# Patient Record
Sex: FEMALE | Race: WHITE | NOT HISPANIC OR LATINO | Employment: FULL TIME | URBAN - METROPOLITAN AREA
[De-identification: names, ages, dates, MRNs, and addresses within clinical notes are randomized per-mention and may not be internally consistent; named-entity substitution may affect disease eponyms.]

---

## 2017-01-11 ENCOUNTER — OFFICE VISIT (OUTPATIENT)
Dept: INTERNAL MEDICINE | Age: 52
End: 2017-01-11

## 2017-01-11 VITALS
HEART RATE: 80 BPM | OXYGEN SATURATION: 99 % | DIASTOLIC BLOOD PRESSURE: 80 MMHG | HEIGHT: 64 IN | SYSTOLIC BLOOD PRESSURE: 142 MMHG | WEIGHT: 151 LBS | TEMPERATURE: 97.8 F | BODY MASS INDEX: 25.78 KG/M2

## 2017-01-11 DIAGNOSIS — R03.0 ELEVATED BLOOD PRESSURE (NOT HYPERTENSION): Primary | Chronic | ICD-10-CM

## 2017-01-11 PROCEDURE — 99212 OFFICE O/P EST SF 10 MIN: CPT | Performed by: INTERNAL MEDICINE

## 2017-01-11 NOTE — ASSESSMENT & PLAN NOTE
Ambulatory blood pressures are stable.  Continue low-sodium diet, regular exercise and maintain ideal body weight.  Consider medication if blood pressure is greater than 140 systolically.

## 2017-01-11 NOTE — MR AVS SNAPSHOT
Autumn Geiger   1/11/2017 11:20 AM   Office Visit    Dept Phone:  [phone]   Encounter #:  [id number]    Provider:  Sanchez Perez MD   Department:  [facility]                Your Full Care Plan              Today's Medication Changes          These changes are accurate as of: 1/11/17 12:03 PM.  If you have any questions, ask your nurse or doctor.               Stop taking medication(s)listed here:     MUCINEX ALLERGY PO   Stopped by:  Sanchez Perez MD                      Your Updated Medication List          This list is accurate as of: 1/11/17 12:03 PM.  Always use your most recent med list.                CALCIUM PO       ibuprofen 200 MG tablet   Commonly known as:  ADVIL,MOTRIN       RECLIPSEN 0.15-30 MG-MCG per tablet   Generic drug:  desogestrel-ethinyl estradiol               You Were Diagnosed With        Codes Comments    Elevated blood pressure (not hypertension)    -  Primary ICD-10-CM: R03.0  ICD-9-CM: 796.2       Instructions     None    Patient Instructions History      Upcoming Appointments     Visit Type Date Time Department    OFFICE VISIT 1/11/2017 11:20 AM NILSA [facility]TS [id number]      [employer] Signup     Our records indicate that you have an active [employer] account.    You can view your After Visit Summary by going to [employer] and logging in with your [employer] username and password.  If you don't have a [employer] username and password but a parent or guardian has access to your record, the parent or guardian should login with their own [employer] username and password and access your record to view the After Visit Summary.    If you have questions, you can email [email or web address] or call [phone] to talk to our [employer] staff.  Remember, [employer] is NOT to be used for urgent needs.  For medical emergencies, dial 911.               Other Info from Your Visit           Allergies     Erythromycin      GI upset.  "     Reason for Visit     elevated blood pressure f/u           Vital Signs     Blood Pressure Pulse Temperature Height Weight Oxygen Saturation    142/80 80 97.8 °F (36.6 °C) 64\" (162.6 cm) 151 lb (68.5 kg) 99%    Body Mass Index Smoking Status                25.92 kg/m2 Never Smoker          Problems and Diagnoses Noted     Elevated blood pressure (not hypertension)        "

## 2017-01-11 NOTE — PROGRESS NOTES
"Autumn Geiger / 51 y.o. / female  01/11/2017    VITALS    Visit Vitals   • /80   • Pulse 80   • Temp 97.8 °F (36.6 °C)   • Ht 64\" (162.6 cm)   • Wt 151 lb (68.5 kg)   • SpO2 99%   • BMI 25.92 kg/m2     BP Readings from Last 3 Encounters:   01/11/17 142/80   11/11/16 162/92   09/18/15 134/82     Wt Readings from Last 3 Encounters:   01/11/17 151 lb (68.5 kg)   11/11/16 156 lb (70.8 kg)   09/18/15 158 lb 15.9 oz (72.1 kg)      Body mass index is 25.92 kg/(m^2).    CC:  Main reason(s) for today's visit: elevated blood pressure f/u      HPI:     History of elevated blood pressure without hypertension and family history of hypertension.  She has been checking her blood pressure regularly at home and her systolic blood pressure generally is in the low 120s and diastolic blood pressure ranges from upper 70s to lower 80s.  Denies headaches.  Her blood pressure tends to run higher at physician's offices.    ____________________________________________________________________    ASSESSMENT & PLAN:    Problem List Items Addressed This Visit        High    Elevated blood pressure (not hypertension) - Primary (Chronic)    Current Assessment & Plan     Ambulatory blood pressures are stable.  Continue low-sodium diet, regular exercise and maintain ideal body weight.  Consider medication if blood pressure is greater than 140 systolically.             No orders of the defined types were placed in this encounter.      Summary/Discussion:     ·       Return in about 6 months (around 7/11/2017) for Annual physical.    No future appointments.    ____________________________________________________________________    REVIEW OF SYSTEMS    Review of Systems  As noted per HPI  Constitutional neg   Resp neg   CV neg    Other: As noted per HPI      PHYSICAL EXAMINATION    Physical Exam  Constitutional  No distress   Cardiovascular Rate  normal . Rhythm: regular . Heart sounds:  normal    Psychiatric  Alert. Judgment and thought " content normal. Mood normal      REVIEWED DATA:    Labs:   Lab Results   Component Value Date     09/18/2015    K 4.3 09/18/2015    AST 18 09/18/2015    ALT 14 09/18/2015    BUN 15 09/18/2015    CREATININE 0.84 09/18/2015    EGFRIFNONA >60 09/18/2015    EGFRIFAFRI >60 09/18/2015       Lab Results   Component Value Date    GLU 85 09/18/2015    HGBA1C 5.4 09/18/2015       Lab Results   Component Value Date     (H) 09/18/2015    HDL 77 (H) 09/18/2015    TRIG 118 09/18/2015    CHOLHDLRATIO 2.9 09/18/2015       Lab Results   Component Value Date    TSH 1.60 09/18/2015    FREET4 1.22 09/18/2015          Lab Results   Component Value Date    WBC 8.10 09/18/2015    WBC 0-2 09/18/2015    HGB 13.0 09/18/2015     09/18/2015        Imaging:        Medical Tests:        Summary of old records / correspondence / consultant report:        Request outside records:          ALLERGIES:    Erythromycin    MEDICATIONS:  Current Outpatient Prescriptions   Medication Sig Dispense Refill   • RECLIPSEN 0.15-30 MG-MCG per tablet TK 1 T PO D  0   • CALCIUM PO Take  by mouth Daily.     • ibuprofen (ADVIL,MOTRIN) 200 MG tablet Take 200 mg by mouth Every 6 (Six) Hours As Needed for mild pain (1-3) (prn).       No current facility-administered medications for this visit.      Outpatient Medications Prior to Visit   Medication Sig Dispense Refill   • RECLIPSEN 0.15-30 MG-MCG per tablet TK 1 T PO D  0   • CALCIUM PO Take  by mouth Daily.     • ibuprofen (ADVIL,MOTRIN) 200 MG tablet Take 200 mg by mouth Every 6 (Six) Hours As Needed for mild pain (1-3) (prn).     • Fexofenadine HCl (MUCINEX ALLERGY PO) Take  by mouth As Needed.       No facility-administered medications prior to visit.        PFSH    The following portions of the patient's history were reviewed and updated as appropriate: Allergies / Current Medications / Past Medical History / Surgical History / Social History / Family History    PROBLEM LIST:    Patient Active  Problem List   Diagnosis   • Elevated blood pressure (not hypertension)   • Atopic rhinitis       PAST MEDICAL HX:    Past Medical History   Diagnosis Date   • Hypothyroidism      In remission for 15 years   • Pneumonia      4 years ago       PAST SURGICAL HX:    Past Surgical History   Procedure Laterality Date   • Tonsillectomy     • Colonoscopy         SOCIAL HX:    Social History     Social History   • Marital status:      Spouse name: N/A   • Number of children: N/A   • Years of education: N/A     Social History Main Topics   • Smoking status: Never Smoker   • Smokeless tobacco: Never Used   • Alcohol use No   • Drug use: No   • Sexual activity: Not Asked     Other Topics Concern   • None     Social History Narrative       FAMILY HX:    Family History   Problem Relation Age of Onset   • Heart disease Mother    • Hyperlipidemia Mother    • Hypertension Mother    • Arthritis Mother    • Diabetes Mother    • Hyperlipidemia Father    • Hypertension Father

## 2017-08-21 DIAGNOSIS — Z00.00 PREVENTATIVE HEALTH CARE: Primary | ICD-10-CM

## 2017-08-23 LAB
ALBUMIN SERPL-MCNC: 4.2 G/DL (ref 3.5–5.2)
ALBUMIN/GLOB SERPL: 1.6 G/DL
ALP SERPL-CCNC: 68 U/L (ref 39–117)
ALT SERPL-CCNC: 12 U/L (ref 1–33)
APPEARANCE UR: CLEAR
AST SERPL-CCNC: 19 U/L (ref 1–32)
BACTERIA #/AREA URNS HPF: ABNORMAL /HPF
BASOPHILS # BLD AUTO: 0.02 10*3/MM3 (ref 0–0.2)
BASOPHILS NFR BLD AUTO: 0.3 % (ref 0–1.5)
BILIRUB SERPL-MCNC: 0.9 MG/DL (ref 0.1–1.2)
BILIRUB UR QL STRIP: NEGATIVE
BUN SERPL-MCNC: 12 MG/DL (ref 6–20)
BUN/CREAT SERPL: 13.6 (ref 7–25)
CALCIUM SERPL-MCNC: 9.3 MG/DL (ref 8.6–10.5)
CASTS URNS MICRO: ABNORMAL
CHLORIDE SERPL-SCNC: 100 MMOL/L (ref 98–107)
CHOLEST SERPL-MCNC: 205 MG/DL (ref 0–200)
CHOLEST/HDLC SERPL: 2.97 {RATIO}
CO2 SERPL-SCNC: 23.8 MMOL/L (ref 22–29)
COLOR UR: YELLOW
CREAT SERPL-MCNC: 0.88 MG/DL (ref 0.57–1)
EOSINOPHIL # BLD AUTO: 0.07 10*3/MM3 (ref 0–0.7)
EOSINOPHIL NFR BLD AUTO: 1 % (ref 0.3–6.2)
EPI CELLS #/AREA URNS HPF: ABNORMAL /HPF
ERYTHROCYTE [DISTWIDTH] IN BLOOD BY AUTOMATED COUNT: 13.2 % (ref 11.7–13)
GLOBULIN SER CALC-MCNC: 2.7 GM/DL
GLUCOSE SERPL-MCNC: 87 MG/DL (ref 65–99)
GLUCOSE UR QL: NEGATIVE
HBA1C MFR BLD: 5.4 % (ref 4.8–5.6)
HCT VFR BLD AUTO: 38.6 % (ref 35.6–45.5)
HCV AB S/CO SERPL IA: <0.1 S/CO RATIO (ref 0–0.9)
HDLC SERPL-MCNC: 69 MG/DL (ref 40–60)
HGB BLD-MCNC: 12.3 G/DL (ref 11.9–15.5)
HGB UR QL STRIP: ABNORMAL
IMM GRANULOCYTES # BLD: 0 10*3/MM3 (ref 0–0.03)
IMM GRANULOCYTES NFR BLD: 0 % (ref 0–0.5)
KETONES UR QL STRIP: NEGATIVE
LDLC SERPL CALC-MCNC: 110 MG/DL (ref 0–100)
LEUKOCYTE ESTERASE UR QL STRIP: ABNORMAL
LYMPHOCYTES # BLD AUTO: 2.13 10*3/MM3 (ref 0.9–4.8)
LYMPHOCYTES NFR BLD AUTO: 30.4 % (ref 19.6–45.3)
MCH RBC QN AUTO: 30 PG (ref 26.9–32)
MCHC RBC AUTO-ENTMCNC: 31.9 G/DL (ref 32.4–36.3)
MCV RBC AUTO: 94.1 FL (ref 80.5–98.2)
MONOCYTES # BLD AUTO: 0.46 10*3/MM3 (ref 0.2–1.2)
MONOCYTES NFR BLD AUTO: 6.6 % (ref 5–12)
NEUTROPHILS # BLD AUTO: 4.32 10*3/MM3 (ref 1.9–8.1)
NEUTROPHILS NFR BLD AUTO: 61.7 % (ref 42.7–76)
NITRITE UR QL STRIP: NEGATIVE
PH UR STRIP: 7 [PH] (ref 5–8)
PLATELET # BLD AUTO: 262 10*3/MM3 (ref 140–500)
POTASSIUM SERPL-SCNC: 4.4 MMOL/L (ref 3.5–5.2)
PROT SERPL-MCNC: 6.9 G/DL (ref 6–8.5)
PROT UR QL STRIP: NEGATIVE
RBC # BLD AUTO: 4.1 10*6/MM3 (ref 3.9–5.2)
RBC #/AREA URNS HPF: ABNORMAL /HPF
SODIUM SERPL-SCNC: 137 MMOL/L (ref 136–145)
SP GR UR: 1.01 (ref 1–1.03)
T4 FREE SERPL-MCNC: 1.19 NG/DL (ref 0.93–1.7)
TRIGL SERPL-MCNC: 132 MG/DL (ref 0–150)
TSH SERPL DL<=0.005 MIU/L-ACNC: 2.05 MIU/ML (ref 0.27–4.2)
UROBILINOGEN UR STRIP-MCNC: ABNORMAL MG/DL
VLDLC SERPL CALC-MCNC: 26.4 MG/DL (ref 5–40)
WBC # BLD AUTO: 7 10*3/MM3 (ref 4.5–10.7)
WBC #/AREA URNS HPF: ABNORMAL /HPF

## 2017-08-29 ENCOUNTER — OFFICE VISIT (OUTPATIENT)
Dept: INTERNAL MEDICINE | Age: 52
End: 2017-08-29

## 2017-08-29 VITALS
HEART RATE: 84 BPM | WEIGHT: 156 LBS | BODY MASS INDEX: 26.63 KG/M2 | DIASTOLIC BLOOD PRESSURE: 82 MMHG | SYSTOLIC BLOOD PRESSURE: 134 MMHG | OXYGEN SATURATION: 98 % | TEMPERATURE: 97.6 F | HEIGHT: 64 IN

## 2017-08-29 DIAGNOSIS — Z23 NEED FOR VACCINATION FOR STREP PNEUMONIAE: ICD-10-CM

## 2017-08-29 DIAGNOSIS — Z00.00 ENCOUNTER FOR ANNUAL HEALTH EXAMINATION: Primary | ICD-10-CM

## 2017-08-29 PROBLEM — J18.9 PNEUMONIA: Status: ACTIVE | Noted: 2017-08-29

## 2017-08-29 PROBLEM — J18.9 PNEUMONIA: Status: RESOLVED | Noted: 2017-08-29 | Resolved: 2017-08-29

## 2017-08-29 PROBLEM — E05.90 HYPERTHYROIDISM: Status: ACTIVE | Noted: 2017-08-29

## 2017-08-29 PROCEDURE — 90670 PCV13 VACCINE IM: CPT | Performed by: INTERNAL MEDICINE

## 2017-08-29 PROCEDURE — 90471 IMMUNIZATION ADMIN: CPT | Performed by: INTERNAL MEDICINE

## 2017-08-29 PROCEDURE — 99396 PREV VISIT EST AGE 40-64: CPT | Performed by: INTERNAL MEDICINE

## 2017-08-29 NOTE — PROGRESS NOTES
Autumn Geiger / 52 y.o. / female  08/29/2017    CC: Main reason(s) for today's visit: Annual Exam      HPI:      Autumn presents for annual health maintenance visit.    · Last health maintenance visit: 2 years ago  · General health: good  · Lifestyle:  · Attempting to lose weight?: Yes   · Diet: adequate/fair  · Exercise: could be better  · Tobacco: Never used   · Alcohol: does not drink  · Work: Full-time  · Reproductive health:  · Sexually active?: Yes   · Sexual problems?: No problems  · Concern for STD?: No    · Sees Gynecologist?: Yes   · Sylwia/Postmenopausal?: Yes (?)  · Domestic abuse concerns: No   · Depression Screening:      PHQ-2/PHQ-9 Depression Screening 8/29/2017   Little interest or pleasure in doing things 0   Feeling down, depressed, or hopeless 0   Total Score 0         PHQ-2: 0 (Not depressed)   PHQ-9:     Patient Care Team:  Sanchez Perez MD as PCP - Family Medicine  Joey Sorenson MD as Consulting Physician (Obstetrics and Gynecology)  ______________________________________________________________________    Allergies   Allergen Reactions   • Erythromycin GI Intolerance     GI upset.       Current Outpatient Prescriptions on File Prior to Visit   Medication Sig Dispense Refill   • CALCIUM PO Take  by mouth Daily.     • ibuprofen (ADVIL,MOTRIN) 200 MG tablet Take 200 mg by mouth Every 6 (Six) Hours As Needed for mild pain (1-3) (prn).     • RECLIPSEN 0.15-30 MG-MCG per tablet TK 1 T PO D  0     No current facility-administered medications on file prior to visit.        PFSH:     The following portions of the patient's history were reviewed and updated as appropriate: Allergies / Current Medications / Past Medical History / Surgical History / Social History / Family History    Patient Active Problem List   Diagnosis   • Elevated blood pressure (not hypertension)   • AR (allergic rhinitis)       Past Medical History:   Diagnosis Date   • Hyperthyroidism 8/29/2017    During pregnancy, resolved   •  "Pneumonia     several times       Past Surgical History:   Procedure Laterality Date   • BREAST CYST ASPIRATION      x 2   • COLONOSCOPY     • TONSILLECTOMY         Social History     Social History   • Marital status:      Spouse name: Surekha   • Number of children: 2   • Years of education: N/A     Occupational History   •       Social History Main Topics   • Smoking status: Never Smoker   • Smokeless tobacco: Never Used   • Alcohol use No   • Drug use: No   • Sexual activity: Yes     Partners: Female, Male     Birth control/ protection: OCP     Other Topics Concern   • None     Social History Narrative    1 son with Down's       Family History   Problem Relation Age of Onset   • Heart disease Mother    • Hyperlipidemia Mother    • Hypertension Mother    • Arthritis Mother    • Diabetes Mother    • Cancer Mother      small bowel cancer   • Cirrhosis Mother    • Hyperlipidemia Father    • Hypertension Father    • Down syndrome Son    • Clotting disorder Brother      PE   • Hypothyroidism Brother    • Melanoma Brother    • Obesity Brother        Immunization History   Administered Date(s) Administered   • Influenza TIV (IM) 09/01/2011, 10/06/2016   • Tdap 01/25/2012   • Varicella 08/29/1998       ______________________________________________________________________    REVIEW OF SYSTEMS    Review of Systems   Constitutional: Negative.    HENT: Positive for tinnitus. Negative for hearing loss.    Eyes: Negative.    Respiratory: Negative.    Cardiovascular: Negative.    Gastrointestinal: Negative.    Endocrine: Negative.    Genitourinary: Negative.    Musculoskeletal: Negative.    Skin: Negative.    Allergic/Immunologic: Positive for environmental allergies.   Neurological: Negative.    Hematological: Negative.    Psychiatric/Behavioral: Negative.        VITALS:    /82  Pulse 84  Temp 97.6 °F (36.4 °C)  Ht 64\" (162.6 cm)  Wt 156 lb (70.8 kg)  SpO2 98%  BMI 26.78 kg/m2  BP Readings from " Last 3 Encounters:   08/29/17 134/82   01/11/17 142/80   11/11/16 162/92     Wt Readings from Last 3 Encounters:   08/29/17 156 lb (70.8 kg)   01/11/17 151 lb (68.5 kg)   11/11/16 156 lb (70.8 kg)      Body mass index is 26.78 kg/(m^2).    PHYSICAL EXAMINATION    Physical Exam   Constitutional: She is oriented to person, place, and time. She appears well-developed and well-nourished. No distress.   HENT:   Head: Normocephalic and atraumatic.   Right Ear: External ear normal.   Left Ear: External ear normal.   Nose: Nose normal.   Mouth/Throat: Oropharynx is clear and moist.   Eyes: Conjunctivae and EOM are normal. Pupils are equal, round, and reactive to light. No scleral icterus.   Neck: Normal range of motion. Neck supple. No tracheal deviation present. No thyroid mass and no thyromegaly present.   Cardiovascular: Normal rate, regular rhythm, normal heart sounds and intact distal pulses.    Pulmonary/Chest: Effort normal and breath sounds normal.   Abdominal: Soft. Bowel sounds are normal. She exhibits no distension and no mass. There is no tenderness. No hernia.   Genitourinary:   Genitourinary Comments: Deferred to gyne/by patient unless specified otherwise.    Musculoskeletal: Normal range of motion. She exhibits no edema or deformity.   Neurological: She is alert and oriented to person, place, and time. She has normal reflexes. No cranial nerve deficit. She exhibits normal muscle tone. Coordination normal.   Skin: Skin is warm. No rash noted. No pallor.   Psychiatric: She has a normal mood and affect. Her behavior is normal. Judgment and thought content normal.       REVIEWED DATA:    Lab Results   Component Value Date     08/22/2017    K 4.4 08/22/2017    AST 19 08/22/2017    ALT 12 08/22/2017    BUN 12 08/22/2017    CREATININE 0.88 08/22/2017    CREATININE 0.84 09/18/2015    EGFRIFNONA 67 08/22/2017    EGFRIFAFRI 81 08/22/2017       Lab Results   Component Value Date    GLU 87 08/22/2017    HGBA1C 5.40  08/22/2017    HGBA1C 5.4 09/18/2015    TSH 2.050 08/22/2017    FREET4 1.19 08/22/2017       Lab Results   Component Value Date     (H) 08/22/2017    HDL 69 (H) 08/22/2017    TRIG 132 08/22/2017    CHOLHDLRATIO 2.97 08/22/2017       Lab Results   Component Value Date    VCSC66PO 44.1 09/18/2015        Lab Results   Component Value Date    WBC 7.00 08/22/2017    HGB 12.3 08/22/2017    MCV 94.1 08/22/2017     08/22/2017       Lab Results   Component Value Date    PROTEIN Negative 08/22/2017    GLUCOSEU Negative 08/22/2017    BLOODU Trace (A) 08/22/2017    NITRITEU Negative 08/22/2017    LEUKOCYTESUR See below: (A) 08/22/2017          Lab Results   Component Value Date    HEPCVIRUSABY <0.1 08/22/2017       ______________________________________________________________________    ASSESSMENT & PLAN    ANNUAL WELLNESS EXAM / PHYSICAL     Other medical problems addressed today:  Problem List Items Addressed This Visit     None      Visit Diagnoses     Encounter for annual health examination    -  Primary    Relevant Orders    Pneumococcal Conjugate Vaccine 13-Valent All    Need for vaccination for Strep pneumoniae        Relevant Orders    Pneumococcal Conjugate Vaccine 13-Valent All          Summary/Discussion:     ·       Return in about 1 year (around 8/29/2018) for Annual physical.    No future appointments.    HEALTHCARE MAINTENANCE ISSUES:    Cancer Screening:  · Colon: Initial/Next screening at age: CURRENT  · Repeat colonoscopy every 7 years  · Breast: Recommended monthly self exams; annual professional exam  · Mammogram: every 1 year  · Cervical: 1 year  · Skin: Monthly self skin examination, annual exam by health professional  · Lung:   · Other:    Screening Labs & Tests:  · Lab results reviewed & discussed with the patient or test orders placed today.  · EKG:  · Vascular Screening:   · DEXA (65+ or postmenopausal with risk factors):   · HEP C (If born [birth year]194[birth year], or risk factors): Negative  screen    Immunization/Vaccinations (to be given today unless deferred by patient)  · Tetanus/Pertussis: Up to date  · Pneumovax: Not needed at this time  · Prevnar 13: Administer today  · Zostavax: Not needed at this time  · Influenza: Recommended annual flu shot  · Other:     Lifestyle Counseling:  · Lifestyle Modifications: Continue good lifestyle choices/modifications  · Safety Issues: Always wear seatbelt, Avoid texting while driving   · Use sunscreen, regular skin examination  · Recommended annual dental/vision examination.  · Emotional/Stress/Sleep: Reviewed and  given when appropriate      Health Maintenance   Topic Date Due   • INFLUENZA VACCINE  09/01/2017   • MAMMOGRAM  11/11/2017   • PAP SMEAR  11/11/2017   • TDAP/TD VACCINES (2 - Td) 01/25/2022   • COLONOSCOPY  11/12/2022   • HEPATITIS C SCREENING  Completed         **Alexandro Disclaimer:   Much of this encounter note is an electronic transcription/translation of spoken language to printed text. The electronic translation of spoken language may permit erroneous, or at times, nonsensical words or phrases to be inadvertently transcribed. Although I have reviewed the note for such errors, some may still exist.

## 2018-07-02 ENCOUNTER — TELEPHONE (OUTPATIENT)
Dept: INTERNAL MEDICINE | Age: 53
End: 2018-07-02

## 2018-07-02 NOTE — TELEPHONE ENCOUNTER
Patient wants to know if she has ever had the Hepatitis A vaccine and where she can get it at if she has never had it?

## 2020-03-06 ENCOUNTER — OFFICE VISIT (OUTPATIENT)
Dept: INTERNAL MEDICINE | Age: 55
End: 2020-03-06

## 2020-03-06 VITALS
SYSTOLIC BLOOD PRESSURE: 134 MMHG | TEMPERATURE: 97.9 F | OXYGEN SATURATION: 98 % | WEIGHT: 155.4 LBS | HEIGHT: 64 IN | BODY MASS INDEX: 26.53 KG/M2 | HEART RATE: 81 BPM | DIASTOLIC BLOOD PRESSURE: 86 MMHG

## 2020-03-06 DIAGNOSIS — Z87.01 HISTORY OF PNEUMONIA: ICD-10-CM

## 2020-03-06 DIAGNOSIS — J06.9 ACUTE URI: Primary | ICD-10-CM

## 2020-03-06 DIAGNOSIS — H65.191 ACUTE EFFUSION OF RIGHT EAR: ICD-10-CM

## 2020-03-06 PROCEDURE — 99214 OFFICE O/P EST MOD 30 MIN: CPT | Performed by: NURSE PRACTITIONER

## 2020-03-06 RX ORDER — FLUTICASONE PROPIONATE 50 MCG
2 SPRAY, SUSPENSION (ML) NASAL DAILY
COMMUNITY
Start: 2020-03-06 | End: 2021-08-18

## 2020-03-06 RX ORDER — PSEUDOEPHEDRINE HCL 30 MG
30 TABLET ORAL EVERY 4 HOURS PRN
COMMUNITY
Start: 2020-03-06 | End: 2021-08-18

## 2020-03-06 RX ORDER — AZITHROMYCIN 250 MG/1
TABLET, FILM COATED ORAL
Qty: 6 TABLET | Refills: 0 | Status: SHIPPED | OUTPATIENT
Start: 2020-03-06 | End: 2021-08-18

## 2020-03-06 RX ORDER — GUAIFENESIN 600 MG/1
1200 TABLET, EXTENDED RELEASE ORAL 2 TIMES DAILY
COMMUNITY
End: 2022-03-24

## 2020-03-06 NOTE — PATIENT INSTRUCTIONS
"Upper Respiratory Infection, Adult  An upper respiratory infection (URI) affects the nose, throat, and upper air passages. URIs are caused by germs (viruses). The most common type of URI is often called \"the common cold.\"  Medicines cannot cure URIs, but you can do things at home to relieve your symptoms. URIs usually get better within 7-10 days.  Follow these instructions at home:  Activity  · Rest as needed.  · If you have a fever, stay home from work or school until your fever is gone, or until your doctor says you may return to work or school.  ? You should stay home until you cannot spread the infection anymore (you are not contagious).  ? Your doctor may have you wear a face mask so you have less risk of spreading the infection.  Relieving symptoms  · Gargle with a salt-water mixture 3-4 times a day or as needed. To make a salt-water mixture, completely dissolve ½-1 tsp of salt in 1 cup of warm water.  · Use a cool-mist humidifier to add moisture to the air. This can help you breathe more easily.  Eating and drinking    · Drink enough fluid to keep your pee (urine) pale yellow.  · Eat soups and other clear broths.  General instructions    · Take over-the-counter and prescription medicines only as told by your doctor. These include cold medicines, fever reducers, and cough suppressants.  · Do not use any products that contain nicotine or tobacco. These include cigarettes and e-cigarettes. If you need help quitting, ask your doctor.  · Avoid being where people are smoking (avoid secondhand smoke).  · Make sure you get regular shots and get the flu shot every year.  · Keep all follow-up visits as told by your doctor. This is important.  How to avoid spreading infection to others    · Wash your hands often with soap and water. If you do not have soap and water, use hand .  · Avoid touching your mouth, face, eyes, or nose.  · Cough or sneeze into a tissue or your sleeve or elbow. Do not cough or sneeze " "into your hand or into the air.  Contact a doctor if:  · You are getting worse, not better.  · You have any of these:  ? A fever.  ? Chills.  ? Brown or red mucus in your nose.  ? Yellow or brown fluid (discharge)coming from your nose.  ? Pain in your face, especially when you bend forward.  ? Swollen neck glands.  ? Pain with swallowing.  ? White areas in the back of your throat.  Get help right away if:  · You have shortness of breath that gets worse.  · You have very bad or constant:  ? Headache.  ? Ear pain.  ? Pain in your forehead, behind your eyes, and over your cheekbones (sinus pain).  ? Chest pain.  · You have long-lasting (chronic) lung disease along with any of these:  ? Wheezing.  ? Long-lasting cough.  ? Coughing up blood.  ? A change in your usual mucus.  · You have a stiff neck.  · You have changes in your:  ? Vision.  ? Hearing.  ? Thinking.  ? Mood.  Summary  · An upper respiratory infection (URI) is caused by a germ called a virus. The most common type of URI is often called \"the common cold.\"  · URIs usually get better within 7-10 days.  · Take over-the-counter and prescription medicines only as told by your doctor.  This information is not intended to replace advice given to you by your health care provider. Make sure you discuss any questions you have with your health care provider.  Document Released: 06/05/2009 Document Revised: 12/26/2019 Document Reviewed: 08/10/2018  [employer] Patient Education © 2020 [employer]    "

## 2020-03-06 NOTE — PROGRESS NOTES
[facility] [place] INTERNAL MEDICINE  Sol Geiger / 54 y.o. / female  2020      ASSESSMENT & PLAN:    Problem List Items Addressed This Visit     None      Visit Diagnoses     Acute URI    -  Primary    Relevant Medications    azithromycin (ZITHROMAX Z-DOUG) 250 MG tablet    fluticasone (FLONASE) 50 MCG/ACT nasal spray    pseudoephedrine (SUDAFED) 30 MG tablet    History of pneumonia        Relevant Medications    azithromycin (ZITHROMAX Z-DOUG) 250 MG tablet    fluticasone (FLONASE) 50 MCG/ACT nasal spray    pseudoephedrine (SUDAFED) 30 MG tablet    Acute effusion of right ear        Relevant Medications    fluticasone (FLONASE) 50 MCG/ACT nasal spray    pseudoephedrine (SUDAFED) 30 MG tablet        No orders of the defined types were placed in this encounter.    New Medications Ordered This Visit   Medications   • azithromycin (ZITHROMAX Z-DOUG) 250 MG tablet     Sig: Take 2 tablets the first day, then 1 tablet daily for 4 days.     Dispense:  6 tablet     Refill:  0   • fluticasone (FLONASE) 50 MCG/ACT nasal spray     Si sprays into the nostril(s) as directed by provider Daily.   • pseudoephedrine (SUDAFED) 30 MG tablet     Sig: Take 1 tablet by mouth Every 4 (Four) Hours As Needed for Congestion.       Summary/Discussion:    1. Acute URI  Discussed appropriate conservative and symptomatic treatment with patient, including use of NSAIDs or Tylenol for fever or pain, increase by mouth fluids, and rest. Recommended start azithromycin if no better in the next 24-48 hours or worse. Sudafed, flonase daily for sore throat and ear effusion.   Discussed when to follow up for recheck, including worsening symptoms such as fever, purulent nasal drainage, worsening cough, productive cough, etc.       - fluticasone (FLONASE) 50 MCG/ACT nasal spray; 2 sprays into the nostril(s) as directed by provider Daily.  - pseudoephedrine (SUDAFED) 30 MG tablet; Take 1 tablet by mouth Every 4 (Four) Hours As Needed  "for Congestion.    2. History of pneumonia    - azithromycin (ZITHROMAX Z-DOUG) 250 MG tablet; Take 2 tablets the first day, then 1 tablet daily for 4 days.  Dispense: 6 tablet; Refill: 0  - fluticasone (FLONASE) 50 MCG/ACT nasal spray; 2 sprays into the nostril(s) as directed by provider Daily.  - pseudoephedrine (SUDAFED) 30 MG tablet; Take 1 tablet by mouth Every 4 (Four) Hours As Needed for Congestion.    3. Acute effusion of right ear    - fluticasone (FLONASE) 50 MCG/ACT nasal spray; 2 sprays into the nostril(s) as directed by provider Daily.  - pseudoephedrine (SUDAFED) 30 MG tablet; Take 1 tablet by mouth Every 4 (Four) Hours As Needed for Congestion.        No follow-ups on file.    ____________________________________________________________________    MEDICATIONS  Current Outpatient Medications   Medication Sig Dispense Refill   • guaiFENesin (MUCINEX) 600 MG 12 hr tablet Take 1,200 mg by mouth 2 (Two) Times a Day.     • azithromycin (ZITHROMAX Z-DOUG) 250 MG tablet Take 2 tablets the first day, then 1 tablet daily for 4 days. 6 tablet 0   • CALCIUM PO Take  by mouth Daily.     • fluticasone (FLONASE) 50 MCG/ACT nasal spray 2 sprays into the nostril(s) as directed by provider Daily.     • ibuprofen (ADVIL,MOTRIN) 200 MG tablet Take 200 mg by mouth Every 6 (Six) Hours As Needed for mild pain (1-3) (prn).     • Multiple Vitamin (MULTI VITAMIN PO) Take  by mouth.     • pseudoephedrine (SUDAFED) 30 MG tablet Take 1 tablet by mouth Every 4 (Four) Hours As Needed for Congestion.       No current facility-administered medications for this visit.           VITALS:    Visit Vitals  /86   Pulse 81   Temp 97.9 °F (36.6 °C)   Ht 162.6 cm (64.02\")   Wt 70.5 kg (155 lb 6.4 oz)   SpO2 98%   BMI 26.66 kg/m²       BP Readings from Last 3 Encounters:   03/06/20 134/86   08/29/17 134/82   01/11/17 142/80     Wt Readings from Last 3 Encounters:   03/06/20 70.5 kg (155 lb 6.4 oz)   08/29/17 70.8 kg (156 lb)   01/11/17 " "68.5 kg (151 lb)      Body mass index is 26.66 kg/m².    CC:  Main reason(s) for today's visit: Cough (coughing a lot ); Nasal Congestion (tested for flu, strep & mono on 03.01.20 all Neg. was given tamiflu ); and Sore Throat      HPI: This is a NEW patient as well as a NEW problem to this examiner.       Upper Respiratory Infection: Patient complains of symptoms of a URI. Symptoms include congestion, cough and sore throat. Onset of symptoms was 1 week ago, unchanged since that time. She also c/o non productive cough. She was seen twice at a retail clinic in the past week. She had been tested for flu, strep, and mono and diagnosed with \"flu like symptoms\" for which she was given Tamiflu. She then went back to retail clinic a few days later and was retested and told to treat her sore throat with OTC.   She has been Taking Mucinex, saline rinses, tea, honey, Ibuprofen, tylenol.   She reports previous history of pneumonia multiple times in the past.       Patient Care Team:  Sanchez Perez MD as PCP - General (Internal Medicine)  Sanchez Perez MD as PCP - Family Medicine  Joey Sorenson MD as Consulting Physician (Obstetrics and Gynecology)    ____________________________________________________________________    REVIEW OF SYSTEMS    Review of Systems   Constitutional: Negative for fever.   HENT: Positive for congestion, ear pain, sinus pressure and sore throat. Negative for postnasal drip, rhinorrhea, sneezing and trouble swallowing.    Eyes: Negative for discharge, redness and itching.   Respiratory: Positive for cough. Negative for shortness of breath and wheezing.    Cardiovascular: Negative for chest pain.   Gastrointestinal: Negative for diarrhea, nausea and vomiting.   Musculoskeletal: Negative for arthralgias and myalgias.   Hematological: Negative for adenopathy.         PHYSICAL EXAMINATION    Physical Exam   Constitutional: She is oriented to person, place, and time. Vital signs are normal. She appears " well-developed and well-nourished. She is cooperative. She does not appear ill. No distress.   HENT:   Head: Normocephalic and atraumatic.   Right Ear: Hearing, external ear and ear canal normal. No drainage or swelling. Tympanic membrane is not injected and not erythematous. A middle ear effusion is present.   Left Ear: Hearing, tympanic membrane, external ear and ear canal normal. No drainage or swelling. Tympanic membrane is not injected and not erythematous.  No middle ear effusion.   Nose: Nose normal.   Mouth/Throat: Uvula is midline and mucous membranes are normal. Posterior oropharyngeal erythema present. No tonsillar exudate.   Cardiovascular: Normal rate, regular rhythm and normal heart sounds.   No murmur heard.  Pulmonary/Chest: Effort normal and breath sounds normal. She has no decreased breath sounds. She has no wheezes. She has no rhonchi. She has no rales.   Lymphadenopathy:     She has no cervical adenopathy.        Right cervical: No superficial cervical, no deep cervical and no posterior cervical adenopathy present.       Left cervical: No superficial cervical, no deep cervical and no posterior cervical adenopathy present.   Neurological: She is alert and oriented to person, place, and time.   Skin: Skin is warm, dry and intact.   Psychiatric: She has a normal mood and affect. Her speech is normal and behavior is normal. Judgment and thought content normal. Cognition and memory are normal.   Nursing note and vitals reviewed.      REVIEWED DATA:    Labs:     Lab Results   Component Value Date     08/22/2017    K 4.4 08/22/2017    AST 19 08/22/2017    ALT 12 08/22/2017    BUN 12 08/22/2017    CREATININE 0.88 08/22/2017    CREATININE 0.84 09/18/2015    EGFRIFNONA 67 08/22/2017    EGFRIFAFRI 81 08/22/2017       Lab Results   Component Value Date    HGBA1C 5.40 08/22/2017    HGBA1C 5.4 09/18/2015       Lab Results   Component Value Date     (H) 08/22/2017     (H) 09/18/2015    HDL  69 (H) 08/22/2017    HDL 77 (H) 09/18/2015    TRIG 132 08/22/2017    TRIG 118 09/18/2015    CHOLHDLRATIO 2.97 08/22/2017    CHOLHDLRATIO 2.9 09/18/2015       Lab Results   Component Value Date    TSH 2.050 08/22/2017    FREET4 1.19 08/22/2017       Lab Results   Component Value Date    WBC 7.00 08/22/2017    HGB 12.3 08/22/2017    HGB 13.0 09/18/2015     08/22/2017       Lab Results   Component Value Date    PROTEIN Negative 08/22/2017    GLUCOSEU Negative 08/22/2017    BLOODU Trace (A) 08/22/2017    NITRITEU Negative 08/22/2017    LEUKOCYTESUR See below: (A) 08/22/2017       Imaging:         Medical Tests:         Summary of old records / correspondence / consultant report:         Request outside records:         ALLERGIES  Allergies   Allergen Reactions   • Erythromycin GI Intolerance     GI upset.        PFSH:     The following portions of the patient's history were reviewed and updated as appropriate: Allergies / Current Medications / Past Medical History / Surgical History / Social History / Family History    PROBLEM LIST   Patient Active Problem List   Diagnosis   • Elevated blood pressure (not hypertension)   • AR (allergic rhinitis)       PAST MEDICAL HISTORY  Past Medical History:   Diagnosis Date   • Hyperthyroidism 8/29/2017    During pregnancy, resolved   • Pneumonia     several times       SURGICAL HISTORY  Past Surgical History:   Procedure Laterality Date   • BREAST CYST ASPIRATION      x 2   • COLONOSCOPY     • TONSILLECTOMY         SOCIAL HISTORY  Social History     Socioeconomic History   • Marital status:      Spouse name: Kamran*   • Number of children: 2   • Years of education: Not on file   • Highest education level: Not on file   Occupational History   • Occupation:    Tobacco Use   • Smoking status: Never Smoker   • Smokeless tobacco: Never Used   Substance and Sexual Activity   • Alcohol use: No   • Drug use: No   • Sexual activity: Yes     Partners: Female, Male      Birth control/protection: OCP   Social History Narrative    1 son with Down's       FAMILY HISTORY  Family History   Problem Relation Age of Onset   • Heart disease Mother    • Hyperlipidemia Mother    • Hypertension Mother    • Arthritis Mother    • Diabetes Mother    • Cancer Mother         small bowel cancer   • Cirrhosis Mother    • Hyperlipidemia Father    • Hypertension Father    • Down syndrome Son    • Clotting disorder Brother         PE   • Hypothyroidism Brother    • Melanoma Brother    • Obesity Brother          **Alexandro Disclaimer:   Much of this encounter note is an electronic transcription/translation of spoken language to printed text. The electronic translation of spoken language may permit erroneous, or at times, nonsensical words or phrases to be inadvertently transcribed. Although I have reviewed the note for such errors, some may still exist.

## 2021-03-30 ENCOUNTER — BULK ORDERING (OUTPATIENT)
Dept: CASE MANAGEMENT | Facility: OTHER | Age: 56
End: 2021-03-30

## 2021-03-30 DIAGNOSIS — Z23 IMMUNIZATION DUE: ICD-10-CM

## 2021-08-18 ENCOUNTER — APPOINTMENT (OUTPATIENT)
Dept: WOMENS IMAGING | Facility: HOSPITAL | Age: 56
End: 2021-08-18

## 2021-08-18 ENCOUNTER — PROCEDURE VISIT (OUTPATIENT)
Dept: OBSTETRICS AND GYNECOLOGY | Facility: CLINIC | Age: 56
End: 2021-08-18

## 2021-08-18 ENCOUNTER — OFFICE VISIT (OUTPATIENT)
Dept: OBSTETRICS AND GYNECOLOGY | Facility: CLINIC | Age: 56
End: 2021-08-18

## 2021-08-18 VITALS
BODY MASS INDEX: 28.82 KG/M2 | DIASTOLIC BLOOD PRESSURE: 80 MMHG | HEIGHT: 64 IN | SYSTOLIC BLOOD PRESSURE: 142 MMHG | WEIGHT: 168.8 LBS

## 2021-08-18 DIAGNOSIS — Z12.31 VISIT FOR SCREENING MAMMOGRAM: Primary | ICD-10-CM

## 2021-08-18 DIAGNOSIS — Z01.419 ENCOUNTER FOR GYNECOLOGICAL EXAMINATION WITHOUT ABNORMAL FINDING: Primary | ICD-10-CM

## 2021-08-18 PROCEDURE — 77063 BREAST TOMOSYNTHESIS BI: CPT | Performed by: OBSTETRICS & GYNECOLOGY

## 2021-08-18 PROCEDURE — 77067 SCR MAMMO BI INCL CAD: CPT | Performed by: RADIOLOGY

## 2021-08-18 PROCEDURE — 77067 SCR MAMMO BI INCL CAD: CPT | Performed by: OBSTETRICS & GYNECOLOGY

## 2021-08-18 PROCEDURE — 99386 PREV VISIT NEW AGE 40-64: CPT | Performed by: OBSTETRICS & GYNECOLOGY

## 2021-08-18 PROCEDURE — 77063 BREAST TOMOSYNTHESIS BI: CPT | Performed by: RADIOLOGY

## 2021-08-18 NOTE — PROGRESS NOTES
GYN Annual Exam     CC- Here for annual exam.     Autumn Geiger is a 56 y.o. female who presents for annual well woman exam. She IS/ IS NOT: is menopausal.  She is experiencing and/or reports none.  She denies postmenopausal vaginal bleeding.  She denies vomiting, abdominal pain, diarrhea and cough.  Today, she wishes to specifically address nothing specifically.  I explained to her that current ACOG guidelines state that screening Pap smears are no longer recommended after the age of 65, nor after hysterectomy for benign reasons.  Both children are doing well.  Her son is looking at getting a job at [employer].  Her daughter is an  and recently .    OB History        2    Para   2    Term   2            AB        Living   2       SAB        TAB        Ectopic        Molar        Multiple        Live Births   2                Current contraception: post menopausal status  History of abnormal Pap smear: no  Family history of uterine, colon or ovarian cancer: no  History of abnormal mammogram: no  Family history of breast cancer: no  Last Pap :   Last mammogram:  and done today  Last colonoscopy: Up-to-date  Last DEXA: Not yet done      Past Medical History:   Diagnosis Date   • Hyperthyroidism 2017    During pregnancy, resolved   • Pneumonia     several times       Past Surgical History:   Procedure Laterality Date   • BREAST CYST ASPIRATION      x 2   • COLONOSCOPY     • TONSILLECTOMY           Current Outpatient Medications:   •  guaiFENesin (MUCINEX) 600 MG 12 hr tablet, Take 1,200 mg by mouth 2 (Two) Times a Day., Disp: , Rfl:   •  ibuprofen (ADVIL,MOTRIN) 200 MG tablet, Take 200 mg by mouth Every 6 (Six) Hours As Needed for mild pain (1-3) (prn)., Disp: , Rfl:   •  Multiple Vitamin (MULTI VITAMIN PO), Take  by mouth., Disp: , Rfl:     Allergies   Allergen Reactions   • Erythromycin GI Intolerance     GI upset.       Social History     Tobacco Use   • Smoking status: Never  "Smoker   • Smokeless tobacco: Never Used   Vaping Use   • Vaping Use: Never used   Substance Use Topics   • Alcohol use: No   • Drug use: No       Family History   Problem Relation Age of Onset   • Heart disease Mother    • Hyperlipidemia Mother    • Hypertension Mother    • Arthritis Mother    • Diabetes Mother    • Cancer Mother         small bowel cancer   • Cirrhosis Mother    • Hyperlipidemia Father    • Hypertension Father    • Down syndrome Son    • Clotting disorder Brother         PE   • Hypothyroidism Brother    • Melanoma Brother    • Obesity Brother        Review of Systems   Constitutional: Negative for fatigue and fever.   Respiratory: Negative for shortness of breath.    Genitourinary: Negative for pelvic pain, urinary incontinence and vaginal bleeding.       /80   Ht 162.6 cm (64\")   Wt 76.6 kg (168 lb 12.8 oz)   BMI 28.97 kg/m²     Physical Exam  Constitutional:       Appearance: She is normal weight.   Genitourinary:      Pelvic exam was performed with patient in the lithotomy position.      Vulva, inguinal canal, urethra, bladder, vagina, cervix and uterus normal.      Bladder is not tender.      Bladder neck is mobile.      Uterus is mobile.      Uterus is midaxial and regular.      No right or left adnexal mass present.      Right adnexa not tender.      Left adnexa not tender.   Neck:      Thyroid: No thyroid mass or thyromegaly.   Chest:      Breasts:         Right: No mass, nipple discharge, skin change or tenderness.         Left: No mass, nipple discharge, skin change or tenderness.   Abdominal:      General: Abdomen is flat.      Palpations: Abdomen is soft. There is no mass.      Tenderness: There is no abdominal tenderness.   Neurological:      Mental Status: She is alert.   Vitals reviewed.             Assessment     1) GYN annual well woman exam.   2) menopausal state.  Doing very well with no symptomatic complaints.     Plan     1) Breast Health - Clinical breast exam & " mammogram reviewed specifically American Cancer Society recommendations for screening specific to her, and Self breast awareness monthly  2) Pap -done without high risk HPV as this was done in 2020  3) Smoking status-negative  4) Colon health - screening colonoscopy recommended if not up to date  5) Bone health - Weight bearing exercise, dietary calcium recommendations and vitamin D reviewed.   6) Encouraged to be wary of information obtained via social media and internet based on source and search.   7) Follow up prn and one year      Joey Sorenson MD   8/18/2021  09:39 EDT

## 2021-08-21 LAB
CONV .: NORMAL
CYTOLOGIST CVX/VAG CYTO: NORMAL
CYTOLOGY CVX/VAG DOC CYTO: NORMAL
CYTOLOGY CVX/VAG DOC THIN PREP: NORMAL
DX ICD CODE: NORMAL
HIV 1 & 2 AB SER-IMP: NORMAL
Lab: NORMAL
OTHER STN SPEC: NORMAL
STAT OF ADQ CVX/VAG CYTO-IMP: NORMAL

## 2022-03-24 ENCOUNTER — OFFICE VISIT (OUTPATIENT)
Dept: INTERNAL MEDICINE | Age: 57
End: 2022-03-24

## 2022-03-24 VITALS
HEART RATE: 68 BPM | DIASTOLIC BLOOD PRESSURE: 90 MMHG | HEIGHT: 64 IN | SYSTOLIC BLOOD PRESSURE: 158 MMHG | TEMPERATURE: 97.1 F | WEIGHT: 165 LBS | OXYGEN SATURATION: 98 % | BODY MASS INDEX: 28.17 KG/M2

## 2022-03-24 DIAGNOSIS — R10.13 DYSPEPSIA: Primary | ICD-10-CM

## 2022-03-24 DIAGNOSIS — R03.0 ELEVATED BLOOD PRESSURE READING WITHOUT DIAGNOSIS OF HYPERTENSION: Chronic | ICD-10-CM

## 2022-03-24 PROCEDURE — 99214 OFFICE O/P EST MOD 30 MIN: CPT | Performed by: INTERNAL MEDICINE

## 2022-03-24 RX ORDER — PANTOPRAZOLE SODIUM 40 MG/1
40 TABLET, DELAYED RELEASE ORAL DAILY
Qty: 30 TABLET | Refills: 0 | Status: SHIPPED | OUTPATIENT
Start: 2022-03-24 | End: 2022-04-20

## 2022-03-24 NOTE — PATIENT INSTRUCTIONS
** IMPORTANT MESSAGE FROM DR. CHANEY **    In our office, your satisfaction is VERY important to us.     You may receive a survey from [employer] by mail or E-mail for you to provide feedback about your visit. This information is invaluable for me to know what we can do to improve our services.     I ask that you please take a few minutes to complete the survey and let us know how we are doing in serving your needs. (You may receive the survey more than once for multiple visits)    Thank You !    Dr. Chaney    _________________________________________________________________________________________________________________________      ** ADDITIONAL INSTRUCTION / REMINDERS FROM DR. CHANEY **    Managing Your Hypertension  Hypertension, also called high blood pressure, is when the force of the blood pressing against the walls of the arteries is too strong. Arteries are blood vessels that carry blood from your heart throughout your body. Hypertension forces the heart to work harder to pump blood and may cause the arteries to become narrow or stiff.  Understanding blood pressure readings  Your personal target blood pressure may vary depending on your medical conditions, your age, and other factors. A blood pressure reading includes a higher number over a lower number. Ideally, your blood pressure should be below 120/80. You should know that:  The first, or top, number is called the systolic pressure. It is a measure of the pressure in your arteries as your heart beats.  The second, or bottom number, is called the diastolic pressure. It is a measure of the pressure in your arteries as the heart relaxes.  Blood pressure is classified into four stages. Based on your blood pressure reading, your health care provider may use the following stages to determine what type of treatment you need, if any. Systolic pressure and diastolic pressure are measured in a unit called mmHg.  Normal  Systolic pressure: below 120.  Diastolic pressure:  below 80.  Elevated  Systolic pressure: 120-129.  Diastolic pressure: below 80.  Hypertension stage 1  Systolic pressure: 130-139.  Diastolic pressure: 80-89.  Hypertension stage 2  Systolic pressure: 140 or above.  Diastolic pressure: 90 or above.  How can this condition affect me?  Managing your hypertension is an important responsibility. Over time, hypertension can damage the arteries and decrease blood flow to important parts of the body, including the brain, heart, and kidneys. Having untreated or uncontrolled hypertension can lead to:  A heart attack.  A stroke.  A weakened blood vessel (aneurysm).  Heart failure.  Kidney damage.  Eye damage.  Metabolic syndrome.  Memory and concentration problems.  Vascular dementia.  What actions can I take to manage this condition?  Hypertension can be managed by making lifestyle changes and possibly by taking medicines. Your health care provider will help you make a plan to bring your blood pressure within a normal range.  Nutrition    Eat a diet that is high in fiber and potassium, and low in salt (sodium), added sugar, and fat. An example eating plan is called the Dietary Approaches to Stop Hypertension (DASH) diet. To eat this way:  Eat plenty of fresh fruits and vegetables. Try to fill one-half of your plate at each meal with fruits and vegetables.  Eat whole grains, such as whole-wheat pasta, brown rice, or whole-grain bread. Fill about one-fourth of your plate with whole grains.  Eat low-fat dairy products.  Avoid fatty cuts of meat, processed or cured meats, and poultry with skin. Fill about one-fourth of your plate with lean proteins such as fish, chicken without skin, beans, eggs, and tofu.  Avoid pre-made and processed foods. These tend to be higher in sodium, added sugar, and fat.  Reduce your daily sodium intake. Most people with hypertension should eat less than 1,500 mg of sodium a day.    Lifestyle    Work with your health care provider to maintain a  healthy body weight or to lose weight. Ask what an ideal weight is for you.  Get at least 30 minutes of exercise that causes your heart to beat faster (aerobic exercise) most days of the week. Activities may include walking, swimming, or biking.  Include exercise to strengthen your muscles (resistance exercise), such as weight lifting, as part of your weekly exercise routine. Try to do these types of exercises for 30 minutes at least 3 days a week.  Do not use any products that contain nicotine or tobacco, such as cigarettes, e-cigarettes, and chewing tobacco. If you need help quitting, ask your health care provider.  Control any long-term (chronic) conditions you have, such as high cholesterol or diabetes.  Identify your sources of stress and find ways to manage stress. This may include meditation, deep breathing, or making time for fun activities.    Alcohol use  Do not drink alcohol if:  Your health care provider tells you not to drink.  You are pregnant, may be pregnant, or are planning to become pregnant.  If you drink alcohol:  Limit how much you use to:  0-1 drink a day for women.  0-2 drinks a day for men.  Be aware of how much alcohol is in your drink. In the [place] one drink equals one 12 oz bottle of beer (355 mL), one 5 oz glass of wine (148 mL), or one 1½ oz glass of hard liquor (44 mL).  Medicines  Your health care provider may prescribe medicine if lifestyle changes are not enough to get your blood pressure under control and if:  Your systolic blood pressure is 130 or higher.  Your diastolic blood pressure is 80 or higher.  Take medicines only as told by your health care provider. Follow the directions carefully. Blood pressure medicines must be taken as told by your health care provider. The medicine does not work as well when you skip doses. Skipping doses also puts you at risk for problems.  Monitoring  Before you monitor your blood pressure:  Do not smoke, drink caffeinated beverages, or exercise  within 30 minutes before taking a measurement.  Use the bathroom and empty your bladder (urinate).  Sit quietly for at least 5 minutes before taking measurements.  Monitor your blood pressure at home as told by your health care provider. To do this:  Sit with your back straight and supported.  Place your feet flat on the floor. Do not cross your legs.  Support your arm on a flat surface, such as a table. Make sure your upper arm is at heart level.  Each time you measure, take two or three readings one minute apart and record the results.  You may also need to have your blood pressure checked regularly by your health care provider.  General information  Talk with your health care provider about your diet, exercise habits, and other lifestyle factors that may be contributing to hypertension.  Review all the medicines you take with your health care provider because there may be side effects or interactions.  Keep all visits as told by your health care provider. Your health care provider can help you create and adjust your plan for managing your high blood pressure.  Where to find more information  National Heart, Lung, and [employer]: www.nhlbi.nih.gov  American Heart Association: www.heart.org  Contact a health care provider if:  You think you are having a reaction to medicines you have taken.  You have repeated (recurrent) headaches.  You feel dizzy.  You have swelling in your ankles.  You have trouble with your vision.  Get help right away if:  You develop a severe headache or confusion.  You have unusual weakness or numbness, or you feel faint.  You have severe pain in your chest or abdomen.  You vomit repeatedly.  You have trouble breathing.  These symptoms may represent a serious problem that is an emergency. Do not wait to see if the symptoms will go away. Get medical help right away. Call your local emergency services (911 in the [place] Do not drive yourself to the hospital.  Summary  Hypertension is when  the force of blood pumping through your arteries is too strong. If this condition is not controlled, it may put you at risk for serious complications.  Your personal target blood pressure may vary depending on your medical conditions, your age, and other factors. For most people, a normal blood pressure is less than 120/80.  Hypertension is managed by lifestyle changes, medicines, or both.  Lifestyle changes to help manage hypertension include losing weight, eating a healthy, low-sodium diet, exercising more, stopping smoking, and limiting alcohol.  This information is not intended to replace advice given to you by your health care provider. Make sure you discuss any questions you have with your health care provider.  Document Revised: 01/22/2021 Document Reviewed: 11/17/2020  Elsevier Patient Education © 2021 [employer]

## 2022-03-24 NOTE — PROGRESS NOTES
"    I N T E R N A L  M E D I C I N E  J U N O H  K I M,  M D      ENCOUNTER DATE:  03/24/2022    Autumn Geiger / 56 y.o. / female      CHIEF COMPLAINT / REASON FOR OFFICE VISIT     Burning sensation of the abdomen and left chest      ASSESSMENT & PLAN     1. Dyspepsia    2. Elevated blood pressure (not hypertension)      Orders Placed This Encounter   Procedures   • [facility] BP Flowsheet   • Helicobacter Pylori, IgA IgG IgM   • Comprehensive Metabolic Panel   • CBC & Differential     New Medications Ordered This Visit   Medications   • pantoprazole (PROTONIX) 40 MG EC tablet     Sig: Take 1 tablet by mouth Daily.     Dispense:  30 tablet     Refill:  0       SUMMARY/DISCUSSION  • Patient has symptoms of dyspepsia or possible GERD.  Start pantoprazole 40 mg daily.  Educational handout provided for GERD.  Check H. pylori serology and if positive will check stool antigen test.  • If symptoms do not improve in a month will proceed with EGD.  Discussed in detail with the patient.  • Check CBC CMP  • Advised her to avoid any NSAIDs over-the-counter  • Monitor blood pressure at home.  Released to [facility] blood pressure flowsheet.  Maintain low-sodium diet, maintain good physical activity level and work on moderate weight loss.      Next Appointment with me: Visit date not found    Return for COMBINED annual physical & chronic medical.        VITAL SIGNS     Visit Vitals  /90 (BP Location: Left arm)   Pulse 68   Temp 97.1 °F (36.2 °C)   Ht 162.6 cm (64\")   Wt 74.8 kg (165 lb)   SpO2 98%   BMI 28.32 kg/m²       BP Readings from Last 3 Encounters:   03/24/22 158/90   08/18/21 142/80   03/06/20 134/86     Wt Readings from Last 3 Encounters:   03/24/22 74.8 kg (165 lb)   08/18/21 76.6 kg (168 lb 12.8 oz)   03/06/20 70.5 kg (155 lb 6.4 oz)     Body mass index is 28.32 kg/m².    Blood pressure readings recorded on patient flowsheet:  No flowsheet data found.     MEDICATIONS AT THE TIME OF OFFICE VISIT     Tums PRN " "      HISTORY OF PRESENT ILLNESS     Patient had an upper respiratory type infection about 4 months ago and was seen by the nurse practitioner.  At that time she was taking some ibuprofen.  Since that time she complains of recurrent symptoms of \"burning sensation\" involving her left abdomen and left chest area.  She denies any exertionally related chest pain or shortness of breath.  She denies any cough.  She has no melena or blood in the stool.  No nausea or vomiting.  No change in bowels.  He denies any unusual night sweats, loss of appetite or weight loss.  Taking Tums or eating an apple seems to help.  She has no dysphagia or early satiety symptoms.    Blood pressure is elevated here last 2 visits but home blood pressures look fine.  Her diastolic blood pressure does run in the upper 80s.  She has family history of hypertension.      REVIEW OF SYSTEMS             PHYSICAL EXAMINATION     Physical Exam  No acute distress ; overweight   Cardiovascular: Normal rate, regular rhythm.  Pulm/Chest: Effort normal, breath sounds normal.  Abdomen: Soft and nontender.   Chest wall: no left chest wall tenderness to palpation       REVIEWED DATA     Labs:     Lab Results   Component Value Date     08/22/2017    K 4.4 08/22/2017    CALCIUM 9.3 08/22/2017    AST 19 08/22/2017    ALT 12 08/22/2017    BUN 12 08/22/2017    CREATININE 0.88 08/22/2017    CREATININE 0.84 09/18/2015    EGFRIFNONA 67 08/22/2017    EGFRIFAFRI 81 08/22/2017       Lab Results   Component Value Date    HGBA1C 5.40 08/22/2017    HGBA1C 5.4 09/18/2015       Lab Results   Component Value Date     (H) 08/22/2017     (H) 09/18/2015    HDL 69 (H) 08/22/2017    TRIG 132 08/22/2017       Lab Results   Component Value Date    TSH 2.050 08/22/2017    TSH 1.60 09/18/2015    FREET4 1.19 08/22/2017    FREET4 1.22 09/18/2015       Lab Results   Component Value Date    WBC 7.00 08/22/2017    HGB 12.3 08/22/2017     08/22/2017       No results " found for: MICROALBUR        Imaging:           Medical Tests:           Summary of old records / correspondence / consultant report:           Request outside records:             *Examiner was wearing KN95 mask and eye protection during the entire duration of the visit. Patient was masked the entire time. Minimum social distance of 6 ft maintained entire visit except if physical contact was necessary as documented.       Template created by Peg Perez MD

## 2022-03-25 LAB
ALBUMIN SERPL-MCNC: 4.6 G/DL (ref 3.8–4.9)
ALBUMIN/GLOB SERPL: 1.6 {RATIO} (ref 1.2–2.2)
ALP SERPL-CCNC: 117 IU/L (ref 44–121)
ALT SERPL-CCNC: 23 IU/L (ref 0–32)
AST SERPL-CCNC: 29 IU/L (ref 0–40)
BASOPHILS # BLD AUTO: 0 X10E3/UL (ref 0–0.2)
BASOPHILS NFR BLD AUTO: 1 %
BILIRUB SERPL-MCNC: 1.1 MG/DL (ref 0–1.2)
BUN SERPL-MCNC: 14 MG/DL (ref 6–24)
BUN/CREAT SERPL: 15 (ref 9–23)
CALCIUM SERPL-MCNC: 9.9 MG/DL (ref 8.7–10.2)
CHLORIDE SERPL-SCNC: 100 MMOL/L (ref 96–106)
CO2 SERPL-SCNC: 23 MMOL/L (ref 20–29)
CREAT SERPL-MCNC: 0.91 MG/DL (ref 0.57–1)
EGFRCR SERPLBLD CKD-EPI 2021: 73 ML/MIN/1.73
EOSINOPHIL # BLD AUTO: 0.1 X10E3/UL (ref 0–0.4)
EOSINOPHIL NFR BLD AUTO: 1 %
ERYTHROCYTE [DISTWIDTH] IN BLOOD BY AUTOMATED COUNT: 13 % (ref 11.7–15.4)
GLOBULIN SER CALC-MCNC: 2.8 G/DL (ref 1.5–4.5)
GLUCOSE SERPL-MCNC: 88 MG/DL (ref 65–99)
H PYLORI IGA SER-ACNC: <9 UNITS (ref 0–8.9)
H PYLORI IGG SER IA-ACNC: 0.29 INDEX VALUE (ref 0–0.79)
H PYLORI IGM SER-ACNC: <9 UNITS (ref 0–8.9)
HCT VFR BLD AUTO: 41.4 % (ref 34–46.6)
HGB BLD-MCNC: 13.5 G/DL (ref 11.1–15.9)
IMM GRANULOCYTES # BLD AUTO: 0 X10E3/UL (ref 0–0.1)
IMM GRANULOCYTES NFR BLD AUTO: 0 %
LYMPHOCYTES # BLD AUTO: 2.4 X10E3/UL (ref 0.7–3.1)
LYMPHOCYTES NFR BLD AUTO: 39 %
MCH RBC QN AUTO: 28.7 PG (ref 26.6–33)
MCHC RBC AUTO-ENTMCNC: 32.6 G/DL (ref 31.5–35.7)
MCV RBC AUTO: 88 FL (ref 79–97)
MONOCYTES # BLD AUTO: 0.5 X10E3/UL (ref 0.1–0.9)
MONOCYTES NFR BLD AUTO: 8 %
NEUTROPHILS # BLD AUTO: 3.1 X10E3/UL (ref 1.4–7)
NEUTROPHILS NFR BLD AUTO: 51 %
PLATELET # BLD AUTO: 264 X10E3/UL (ref 150–450)
POTASSIUM SERPL-SCNC: 4.2 MMOL/L (ref 3.5–5.2)
PROT SERPL-MCNC: 7.4 G/DL (ref 6–8.5)
RBC # BLD AUTO: 4.7 X10E6/UL (ref 3.77–5.28)
SODIUM SERPL-SCNC: 138 MMOL/L (ref 134–144)
WBC # BLD AUTO: 6.1 X10E3/UL (ref 3.4–10.8)

## 2022-03-25 NOTE — PROGRESS NOTES
MyChart:    Here are the result(s) of your test(s):     H pylori antibody is NEGATIVE.   CBC (complete blood cell counts) is within normal   Kidney function, liver labs are electrolytes are stable/normal.      Continue plans as discussed.    Please do not hesitate to contact me if you have questions.

## 2022-04-20 DIAGNOSIS — R10.13 DYSPEPSIA: ICD-10-CM

## 2022-04-20 RX ORDER — PANTOPRAZOLE SODIUM 40 MG/1
40 TABLET, DELAYED RELEASE ORAL DAILY
Qty: 90 TABLET | Refills: 3 | Status: SHIPPED | OUTPATIENT
Start: 2022-04-20 | End: 2023-03-27

## 2022-09-13 DIAGNOSIS — Z00.00 PREVENTATIVE HEALTH CARE: Primary | ICD-10-CM

## 2022-09-21 ENCOUNTER — PROCEDURE VISIT (OUTPATIENT)
Dept: OBSTETRICS AND GYNECOLOGY | Facility: CLINIC | Age: 57
End: 2022-09-21

## 2022-09-21 ENCOUNTER — APPOINTMENT (OUTPATIENT)
Dept: WOMENS IMAGING | Facility: HOSPITAL | Age: 57
End: 2022-09-21

## 2022-09-21 ENCOUNTER — OFFICE VISIT (OUTPATIENT)
Dept: OBSTETRICS AND GYNECOLOGY | Facility: CLINIC | Age: 57
End: 2022-09-21

## 2022-09-21 VITALS
WEIGHT: 166.6 LBS | BODY MASS INDEX: 28.44 KG/M2 | SYSTOLIC BLOOD PRESSURE: 142 MMHG | DIASTOLIC BLOOD PRESSURE: 90 MMHG | HEIGHT: 64 IN

## 2022-09-21 DIAGNOSIS — Z01.419 ENCOUNTER FOR GYNECOLOGICAL EXAMINATION WITHOUT ABNORMAL FINDING: Primary | ICD-10-CM

## 2022-09-21 DIAGNOSIS — Z12.31 VISIT FOR SCREENING MAMMOGRAM: Primary | ICD-10-CM

## 2022-09-21 PROCEDURE — 77067 SCR MAMMO BI INCL CAD: CPT | Performed by: OBSTETRICS & GYNECOLOGY

## 2022-09-21 PROCEDURE — 99396 PREV VISIT EST AGE 40-64: CPT | Performed by: OBSTETRICS & GYNECOLOGY

## 2022-09-21 PROCEDURE — 77063 BREAST TOMOSYNTHESIS BI: CPT | Performed by: OBSTETRICS & GYNECOLOGY

## 2022-09-21 PROCEDURE — 77067 SCR MAMMO BI INCL CAD: CPT | Performed by: RADIOLOGY

## 2022-09-21 PROCEDURE — 77063 BREAST TOMOSYNTHESIS BI: CPT | Performed by: RADIOLOGY

## 2022-09-21 NOTE — PROGRESS NOTES
GYN Annual Exam     CC- Here for annual exam.     Autumn Geiger is a 57 y.o. female who presents for annual well woman exam. She is menopausal.  She is experiencing and/or reports no bothersome menopausal symptoms.  She denies postmenopausal vaginal bleeding.  She reports abdominal pain.  Intermittent discomfort and burning discomfort in the left upper quadrant somewhat associated with meals.  She is scheduled to see her primary care provider to follow this up.  Today, she wishes to specifically address just routine screening measures.  I explained to her that current ACOG guidelines state that screening Pap smears are no longer recommended after the age of 65, nor after hysterectomy for benign reasons.    OB History        2    Para   2    Term   2            AB        Living   2       SAB        IAB        Ectopic        Molar        Multiple        Live Births   2                Current contraception: post menopausal status  History of abnormal Pap smear: no  Family history of uterine, colon or ovarian cancer: no  History of abnormal mammogram: no  Family history of breast cancer: no  Last Pap :   Last mammogram:  and done today  Last colonoscopy: Current  Last DEXA: Not yet done      Past Medical History:   Diagnosis Date   • Hyperthyroidism 2017    During pregnancy, resolved   • Pneumonia     several times       Past Surgical History:   Procedure Laterality Date   • BREAST CYST ASPIRATION      x 2   • COLONOSCOPY     • TONSILLECTOMY     • WISDOM TOOTH EXTRACTION           Current Outpatient Medications:   •  pantoprazole (PROTONIX) 40 MG EC tablet, TAKE 1 TABLET BY MOUTH DAILY, Disp: 90 tablet, Rfl: 3    Allergies   Allergen Reactions   • Erythromycin GI Intolerance     GI upset.       Social History     Tobacco Use   • Smoking status: Never Smoker   • Smokeless tobacco: Never Used   Vaping Use   • Vaping Use: Never used   Substance Use Topics   • Alcohol use: No   • Drug use: No  "      Family History   Problem Relation Age of Onset   • Heart disease Mother    • Hyperlipidemia Mother    • Hypertension Mother    • Arthritis Mother    • Diabetes Mother    • Cancer Mother         small bowel cancer   • Cirrhosis Mother    • Hyperlipidemia Father    • Hypertension Father    • Down syndrome Son    • Clotting disorder Brother         PE   • Hypothyroidism Brother    • Melanoma Brother    • Obesity Brother        Review of Systems   Constitutional: Negative for fatigue and fever.   Gastrointestinal: Positive for abdominal pain and GERD.   Genitourinary: Negative for flank pain, urinary incontinence and vaginal bleeding.       /90   Ht 162.6 cm (64\")   Wt 75.6 kg (166 lb 9.6 oz)   BMI 28.60 kg/m²     Physical Exam  Constitutional:       Appearance: She is normal weight.   Genitourinary:      Bladder and urethral meatus normal.      No lesions in the vagina.      Right Labia: No lesions.     Left Labia: No lesions.     No vaginal discharge or bleeding.      No vaginal prolapse present.     No vaginal atrophy present.       Right Adnexa: not tender, not full and no mass present.     Left Adnexa: not tender, not full and no mass present.     No cervical motion tenderness, discharge or lesion.      Uterus is not enlarged, fixed or tender.      No uterine mass detected.     Uterus is midaxial.   Breasts:      Right: No mass, nipple discharge, skin change or tenderness.      Left: No mass, nipple discharge, skin change or tenderness.       Neck:      Thyroid: No thyroid mass or thyromegaly.   Abdominal:      General: Abdomen is flat.      Palpations: Abdomen is soft. There is no mass.      Tenderness: There is no abdominal tenderness.   Neurological:      Mental Status: She is alert.   Vitals reviewed.             Assessment     1) GYN annual well woman exam.   2) menopausal state.  Doing well.  Upper abdominal pain to be evaluated by her primary care physician.  She states her blood pressure at " home is usually normal and will address this also with her primary care provider.     Plan     1) Breast Health - Clinical breast exam & mammogram reviewed specifically American Cancer Society recommendations for screening specific to her, and Self breast awareness monthly  2) Pap -done today with high risk HPV  3) Smoking status-none  4) Colon health - screening colonoscopy recommended if not up to date  5) Bone health - Weight bearing exercise, dietary calcium recommendations and vitamin D reviewed.   6) Encouraged to be wary of information obtained via social media and internet based on source and search.   7) Follow up prn and one year      Joey Sorenson MD   9/21/2022  14:08 EDT

## 2022-09-27 ENCOUNTER — OFFICE VISIT (OUTPATIENT)
Dept: INTERNAL MEDICINE | Age: 57
End: 2022-09-27

## 2022-09-27 VITALS
HEART RATE: 78 BPM | OXYGEN SATURATION: 97 % | BODY MASS INDEX: 28.17 KG/M2 | TEMPERATURE: 97.5 F | SYSTOLIC BLOOD PRESSURE: 150 MMHG | HEIGHT: 64 IN | WEIGHT: 165 LBS | DIASTOLIC BLOOD PRESSURE: 98 MMHG

## 2022-09-27 DIAGNOSIS — Z00.00 ENCOUNTER FOR ANNUAL HEALTH EXAMINATION: Primary | ICD-10-CM

## 2022-09-27 DIAGNOSIS — R10.13 DYSPEPSIA: ICD-10-CM

## 2022-09-27 DIAGNOSIS — R03.0 WHITE COAT SYNDROME WITH HIGH BLOOD PRESSURE BUT WITHOUT HYPERTENSION: ICD-10-CM

## 2022-09-27 LAB
CYTOLOGIST CVX/VAG CYTO: NORMAL
CYTOLOGY CVX/VAG DOC CYTO: NORMAL
CYTOLOGY CVX/VAG DOC THIN PREP: NORMAL
DX ICD CODE: NORMAL
HIV 1 & 2 AB SER-IMP: NORMAL
HPV I/H RISK 4 DNA CVX QL PROBE+SIG AMP: NEGATIVE
OTHER STN SPEC: NORMAL
STAT OF ADQ CVX/VAG CYTO-IMP: NORMAL

## 2022-09-27 PROCEDURE — 90471 IMMUNIZATION ADMIN: CPT | Performed by: INTERNAL MEDICINE

## 2022-09-27 PROCEDURE — 90714 TD VACC NO PRESV 7 YRS+ IM: CPT | Performed by: INTERNAL MEDICINE

## 2022-09-27 PROCEDURE — 99214 OFFICE O/P EST MOD 30 MIN: CPT | Performed by: INTERNAL MEDICINE

## 2022-09-27 PROCEDURE — 99396 PREV VISIT EST AGE 40-64: CPT | Performed by: INTERNAL MEDICINE

## 2022-09-27 NOTE — ASSESSMENT & PLAN NOTE
Resume pantoprazole 40 mg qd x 3 months. If ongoing symptoms, consider EGD. H. Pylori serology was negative.

## 2022-09-27 NOTE — ASSESSMENT & PLAN NOTE
Persistently high here. Home blood pressure looks fine. [employer] blood pressure flowsheet released again for use.     BP Readings from Last 3 Encounters:   09/27/22 150/98   09/21/22 142/90   03/24/22 158/90

## 2022-09-27 NOTE — PROGRESS NOTES
"    I N T E R N A L  M E D I C I N E  J U N O H  K I M,  M D      ENCOUNTER DATE:  09/27/2022    Autumn PENNY Coomes / 57 y.o. / female      CHIEF COMPLAINT     Annual Exam (8/29/17) and Dyspepsia      VITALS     Vitals:    09/27/22 1359   BP: 150/98   BP Location: Left arm   Pulse: 78   Temp: 97.5 °F (36.4 °C)   SpO2: 97%   Weight: 74.8 kg (165 lb)   Height: 162.6 cm (64\")       BP Readings from Last 3 Encounters:   09/27/22 150/98   09/21/22 142/90   03/24/22 158/90     Wt Readings from Last 3 Encounters:   09/27/22 74.8 kg (165 lb)   09/21/22 75.6 kg (166 lb 9.6 oz)   03/24/22 74.8 kg (165 lb)      Body mass index is 28.32 kg/m².    Blood pressure readings recorded on patient flowsheet:  No flowsheet data found.       MEDICATIONS     Current Outpatient Medications on File Prior to Visit   Medication Sig Dispense Refill   • pantoprazole (PROTONIX) 40 MG EC tablet TAKE 1 TABLET BY MOUTH DAILY 90 tablet 3     No current facility-administered medications on file prior to visit.         HISTORY OF PRESENT ILLNESS     Autumn presents for annual health maintenance visit.  Home blood pressure readings reviewed and her blood pressure looks fine.  She likely has whitecoat syndrome.  She complains of recurrent dyspepsia symptoms without melena or blood in the stool.  Denies dysphagia or early satiety.  Previous H. pylori testing was negative.  Her symptoms would improve with pantoprazole but would recur intermittently when off of the medication.  She has tried Pepcid without significant difference in her symptoms.    · General health: fair  · Lifestyle:  · Attempting to lose weight?: Yes   · Diet: eats decently  · Exercise: does not exercise  · Tobacco: Never used   · Alcohol: does not drink  · Work: Full-time  · Reproductive health:  · Sexually active?: Yes   · Sexual problems?: No problems  · Concern for STD?: No    · Sees Gynecologist?: Yes   · Sylwia/Postmenopausal?: Yes   · Domestic abuse concerns: No   · Depression " Screening:      PHQ-2/PHQ-9 Depression Screening 9/27/2022   Retired PHQ-9 Total Score -   Retired Total Score -   Little Interest or Pleasure in Doing Things 0-->not at all   Feeling Down, Depressed or Hopeless 0-->not at all   PHQ-9: Brief Depression Severity Measure Score 0         PHQ-2: 0 (Not depressed)   PHQ-9: 0 (Negative screening for depression)    Patient Care Team:  Sanchez Perez MD as PCP - General (Internal Medicine)  Sanchez Perez MD as PCP - Family Medicine  Joey Sorenson MD as Consulting Physician (Obstetrics and Gynecology)      ALLERGIES  Allergies   Allergen Reactions   • Erythromycin GI Intolerance     GI upset.        PFSH:     The following portions of the patient's history were reviewed and updated as appropriate: Allergies / Current Medications / Past Medical History / Surgical History / Social History / Family History    PROBLEM LIST   Patient Active Problem List   Diagnosis   • White coat syndrome with high blood pressure but without hypertension   • AR (allergic rhinitis)   • Dyspepsia       PAST MEDICAL HISTORY  Past Medical History:   Diagnosis Date   • Hyperthyroidism 08/29/2017    During pregnancy, resolved   • Pneumonia     several times       SURGICAL HISTORY  Past Surgical History:   Procedure Laterality Date   • BREAST CYST ASPIRATION      x 2   • COLONOSCOPY     • TONSILLECTOMY     • WISDOM TOOTH EXTRACTION         SOCIAL HISTORY  Social History     Socioeconomic History   • Marital status:      Spouse name: Kamran*   • Number of children: 2   Tobacco Use   • Smoking status: Never Smoker   • Smokeless tobacco: Never Used   Vaping Use   • Vaping Use: Never used   Substance and Sexual Activity   • Alcohol use: No   • Drug use: No   • Sexual activity: Yes     Partners: Male       FAMILY HISTORY  Family History   Problem Relation Age of Onset   • Heart disease Mother    • Hyperlipidemia Mother    • Hypertension Mother    • Arthritis Mother    • Diabetes Mother    • Cancer  Mother         small bowel cancer   • Cirrhosis Mother    • Hyperlipidemia Father    • Hypertension Father    • Clotting disorder Brother         PE   • Hypothyroidism Brother    • Melanoma Brother    • Obesity Brother    • Down syndrome Son        IMMUNIZATION HISTORY  Immunization History   Administered Date(s) Administered   • Flu Vaccine Intradermal Quad 18-64YR 09/26/2020   • Flu Vaccine Quad PF >36MO 09/26/2020   • FluLaval/Fluzone >6mos 10/07/2019   • Influenza TIV (IM) 09/01/2011, 10/06/2016   • Pneumococcal Conjugate 13-Valent (PCV13) 08/29/2017   • TD Preservative Free 09/27/2022   • Tdap 01/25/2012   • Varicella 08/29/1998, 02/22/1999, 03/23/1999         REVIEW OF SYSTEMS     Review of Systems   Constitutional: Negative.  Negative for unexpected weight change.   HENT: Negative.    Eyes: Negative.    Respiratory: Negative.    Cardiovascular: Negative.    Gastrointestinal: Negative.  Negative for blood in stool (no melena).   Endocrine: Negative.    Genitourinary: Negative.    Musculoskeletal: Negative.    Skin: Negative.    Allergic/Immunologic: Negative.    Neurological: Negative.    Hematological: Negative.    Psychiatric/Behavioral: Negative.          PHYSICAL EXAMINATION     Physical Exam  Constitutional:       General: She is not in acute distress.     Appearance: She is well-developed.      Comments: Overweight    HENT:      Head: Normocephalic and atraumatic.      Right Ear: Tympanic membrane, ear canal and external ear normal.      Left Ear: Tympanic membrane, ear canal and external ear normal.   Eyes:      General: No scleral icterus.     Conjunctiva/sclera: Conjunctivae normal.      Pupils: Pupils are equal, round, and reactive to light.   Neck:      Thyroid: No thyroid mass or thyromegaly.      Vascular: No carotid bruit.      Trachea: No tracheal deviation.   Cardiovascular:      Rate and Rhythm: Normal rate and regular rhythm.      Pulses: Normal pulses.      Heart sounds: Normal heart sounds.    Pulmonary:      Effort: Pulmonary effort is normal.      Breath sounds: Normal breath sounds.   Abdominal:      General: There is no distension.      Palpations: Abdomen is soft. There is no mass.      Tenderness: There is no abdominal tenderness.      Hernia: No hernia is present.   Genitourinary:     Comments: Deferred to gyne/by patient unless specified otherwise.   Musculoskeletal:      Cervical back: Neck supple.      Right lower leg: No edema.      Left lower leg: No edema.   Lymphadenopathy:      Cervical: No cervical adenopathy.   Skin:     General: Skin is warm.      Coloration: Skin is not jaundiced or pale.      Findings: No rash.   Neurological:      General: No focal deficit present.      Mental Status: She is alert and oriented to person, place, and time.      Cranial Nerves: No cranial nerve deficit.      Motor: No abnormal muscle tone.      Coordination: Coordination normal.   Psychiatric:         Mood and Affect: Mood normal.         Behavior: Behavior normal.         Thought Content: Thought content normal.         Judgment: Judgment normal.         REVIEWED DATA      Labs:    Lab Results   Component Value Date     03/24/2022    K 4.2 03/24/2022    CALCIUM 9.9 03/24/2022    AST 29 03/24/2022    ALT 23 03/24/2022    BUN 14 03/24/2022    CREATININE 0.91 03/24/2022    CREATININE 0.88 08/22/2017    CREATININE 0.84 09/18/2015    EGFRIFNONA 67 08/22/2017    EGFRIFAFRI 81 08/22/2017       Lab Results   Component Value Date    GLUCOSE 88 03/24/2022    HGBA1C 5.40 08/22/2017    HGBA1C 5.4 09/18/2015    TSH 2.050 08/22/2017    FREET4 1.19 08/22/2017       Lab Results   Component Value Date     (H) 08/22/2017    HDL 69 (H) 08/22/2017    TRIG 132 08/22/2017    CHOLHDLRATIO 2.97 08/22/2017       Lab Results   Component Value Date    ENOE49MB 44.1 09/18/2015        Lab Results   Component Value Date    WBC 6.1 03/24/2022    HGB 13.5 03/24/2022    MCV 88 03/24/2022     03/24/2022       Lab  Results   Component Value Date    PROTEIN Negative 08/22/2017    GLUCOSEU Negative 08/22/2017    BLOODU Trace (A) 08/22/2017    NITRITEU Negative 08/22/2017    LEUKOCYTESUR See below: (A) 08/22/2017          Lab Results   Component Value Date    HEPCVIRUSABY <0.1 08/22/2017       Imaging:        Medical Tests:        ASSESSMENT & PLAN     ANNUAL WELLNESS EXAM / PHYSICAL     Other medical problems addressed today:  Problem List Items Addressed This Visit        High    White coat syndrome with high blood pressure but without hypertension (Chronic)    Current Assessment & Plan     Persistently high here. Home blood pressure looks fine. [employer] blood pressure flowsheet released again for use.     BP Readings from Last 3 Encounters:   09/27/22 150/98   09/21/22 142/90   03/24/22 158/90             Relevant Orders    [employer] BP Flowsheet    Dyspepsia (Chronic)    Current Assessment & Plan     Resume pantoprazole 40 mg qd x 3 months. If ongoing symptoms, consider EGD. H. Pylori serology was negative.          Relevant Medications    pantoprazole (PROTONIX) 40 MG EC tablet      Other Visit Diagnoses     Encounter for annual health examination    -  Primary          Summary/Discussion:     •     Next Appointment with me: Visit date not found    Return in about 6 months (around 3/27/2023) for Reassess chronic medical problems.      HEALTHCARE MAINTENANCE ISSUES     Cancer Screening:  · Colon: Initial/Next screening at age: CURRENT and -Colonoscopy  · Repeat colon cancer screening: every 10 years  · Breast: Recommended monthly self exams; annual professional exam  · Mammogram: every 1 year  · Cervical: 3 years  · Skin: Monthly self skin examination, annual exam by health professional  · Lung: Does not meet criteria for lung cancer screening.   · Other:    Screening Labs & Tests:  · Lab results reviewed & discussed with the patient or test orders placed today.  · EKG:  · CV Screening: Lipid panel  · DEXA (65+ or postmenopausal  with risk factors):   · HEP C (If born 0329-[birth year], or risk factors): Previously had negative screen  · Other:     Immunization/Vaccinations (to be given today unless deferred by patient)  · Influenza: Recommended annual influenza vaccine  · Hepatitis A: Recommended here or at pharmacy  · Hepatitis B: Not needed at this time  · Tetanus/Pertussis: Administer today  · Pneumococcal: Not needed at this time  · Shingles: Recommended Shingrix at pharmacy  · COVID: Plans to not take the vaccine    Lifestyle Counseling:  · Lifestyle Modifications: Attempt to lose weight, Improve dietary compliance, Begin progressive aerobic exercise program 3-5 days a week, Maintain a low sugar/carbohydrate diet, Follow a low fat, low cholesterol diet and Maintain low sodium diet (< 3 gm) for blood pressure  · Safety Issues: Always wear seatbelt, Avoid texting while driving   · Use sunscreen, regular skin examination  · Recommended annual dental/vision examination.  · Emotional/Stress/Sleep: Reviewed and  given when appropriate      Health Maintenance   Topic Date Due   • ZOSTER VACCINE (1 of 2) Never done   • COVID-19 Vaccine (1) 09/29/2022 (Originally 1965)   • INFLUENZA VACCINE  10/01/2022   • MAMMOGRAM  09/20/2023   • PAP SMEAR  09/21/2023   • ANNUAL PHYSICAL  09/28/2023   • COLORECTAL CANCER SCREENING  11/12/2025   • TDAP/TD VACCINES (3 - Td or Tdap) 09/27/2032   • HEPATITIS C SCREENING  Completed   • Pneumococcal Vaccine 0-64  Aged Out           *Examiner was wearing KN95 mask and eye protection during the entire duration of the visit. Patient was masked the entire time. Minimum social distance of 6 ft maintained entire visit except if physical contact was necessary as documented.       Template created by Peg Perez MD

## 2023-03-27 ENCOUNTER — OFFICE VISIT (OUTPATIENT)
Dept: INTERNAL MEDICINE | Age: 58
End: 2023-03-27
Payer: COMMERCIAL

## 2023-03-27 VITALS
DIASTOLIC BLOOD PRESSURE: 90 MMHG | HEIGHT: 64 IN | TEMPERATURE: 97.3 F | HEART RATE: 66 BPM | BODY MASS INDEX: 26.12 KG/M2 | OXYGEN SATURATION: 96 % | WEIGHT: 153 LBS | SYSTOLIC BLOOD PRESSURE: 154 MMHG

## 2023-03-27 DIAGNOSIS — R10.13 DYSPEPSIA: Primary | Chronic | ICD-10-CM

## 2023-03-27 DIAGNOSIS — R03.0 WHITE COAT SYNDROME WITH HIGH BLOOD PRESSURE BUT WITHOUT HYPERTENSION: Chronic | ICD-10-CM

## 2023-03-27 DIAGNOSIS — E66.3 OVERWEIGHT (BMI 25.0-29.9): ICD-10-CM

## 2023-03-27 DIAGNOSIS — E78.5 HYPERLIPIDEMIA, UNSPECIFIED HYPERLIPIDEMIA TYPE: ICD-10-CM

## 2023-03-27 RX ORDER — FAMOTIDINE 20 MG/1
20 TABLET, FILM COATED ORAL 2 TIMES DAILY PRN
Qty: 180 TABLET | Refills: 3 | Status: SHIPPED | OUTPATIENT
Start: 2023-03-27

## 2023-03-27 NOTE — PATIENT INSTRUCTIONS
** IMPORTANT MESSAGE FROM DR. CHANEY **    In our office, your satisfaction is VERY important to us.     You may receive a survey from [facility] by mail or E-mail for you to provide feedback about your visit. This information is invaluable for me to know what we can do to improve our services.     I ask that you please take a few minutes to complete the survey and let us know how we are doing in serving your needs. (You may receive the survey more than once for multiple visits)    Thank You !    Dr. Chaney    _________________________________________________________________________________________________________________________      ** ADDITIONAL INSTRUCTION / REMINDERS FROM DR. CHANEY **    Minimize caffeine intake and avoid OTC ibuprofen, naproxen (Advil, Motrin, Aleve).

## 2023-03-27 NOTE — ASSESSMENT & PLAN NOTE
BP Readings from Last 3 Encounters:   03/27/23 154/90   09/27/22 150/98   09/21/22 142/90      Blood pressure readings recorded on patient flowsheet:    Time Systolic Diastolic Pulse   3/27/2023  6:45  78 66   3/26/2023  3:37  70 64   3/25/2023 10:55  81 74   3/22/2023  7:07  73 75   3/12/2023 12:31  75 73   2/25/2023  4:46  84 95   2/22/2023  9:10  74 63   2/19/2023  9:36  81 69   2/10/2023  6:01  79 62   1/25/2023  7:03  84 73         Home blood pressure readings look excellent.  Her blood pressure machine was verified for accuracy today.  Continue lifestyle modifications.  No blood pressure medication is indicated at this time.

## 2023-03-27 NOTE — ASSESSMENT & PLAN NOTE
Wt Readings from Last 3 Encounters:   03/27/23 69.4 kg (153 lb)   09/27/22 74.8 kg (165 lb)   09/21/22 75.6 kg (166 lb 9.6 oz)     Body mass index is 26.26 kg/m².    Continue excellent weight loss efforts with lifestyle and dietary modifications.

## 2023-03-27 NOTE — ASSESSMENT & PLAN NOTE
I advised her to take famotidine 20 mg twice daily as needed.  Avoid over-the-counter NSAIDs minimize caffeine.  If symptoms are ongoing or worsening, I recommended getting an EGD.  Previous H. pylori serology was negative.

## 2023-03-27 NOTE — ASSESSMENT & PLAN NOTE
Lab Results   Component Value Date     (H) 09/27/2022     (H) 08/22/2017     (H) 09/18/2015     Lab Results   Component Value Date    HDL 65 (H) 09/27/2022    HDL 69 (H) 08/22/2017    HDL 77 (H) 09/18/2015     Lab Results   Component Value Date    TRIG 91 09/27/2022    TRIG 132 08/22/2017    TRIG 118 09/18/2015     Lab Results   Component Value Date    CHOLHDLRATIO 3.37 09/27/2022    CHOLHDLRATIO 2.97 08/22/2017    CHOLHDLRATIO 2.9 09/18/2015     Maintain low saturated fat and cholesterol diet.  Check fasting labs 1 week prior to follow-up in 6 months.

## 2023-03-27 NOTE — PROGRESS NOTES
I N T E R N A L  M E D I C I N E    J U N O H  K I M,  M D      ENCOUNTER DATE:  03/27/2023    Autumn PENNY Coomes / 57 y.o. / female    CHIEF COMPLAINT / REASON FOR OFFICE VISIT     White coat syndrome with high blood pressure but without hy and Dyspepsia      ASSESSMENT & PLAN     Problem List Items Addressed This Visit        High    White coat syndrome with high blood pressure but without hypertension (Chronic)    Current Assessment & Plan     BP Readings from Last 3 Encounters:   03/27/23 154/90   09/27/22 150/98   09/21/22 142/90      Blood pressure readings recorded on patient flowsheet:    Time Systolic Diastolic Pulse   3/27/2023  6:45  78 66   3/26/2023  3:37  70 64   3/25/2023 10:55  81 74   3/22/2023  7:07  73 75   3/12/2023 12:31  75 73   2/25/2023  4:46  84 95   2/22/2023  9:10  74 63   2/19/2023  9:36  81 69   2/10/2023  6:01  79 62   1/25/2023  7:03  84 73         Home blood pressure readings look excellent.  Her blood pressure machine was verified for accuracy today.  Continue lifestyle modifications.  No blood pressure medication is indicated at this time.         Dyspepsia - Primary (Chronic)    Current Assessment & Plan     I advised her to take famotidine 20 mg twice daily as needed.  Avoid over-the-counter NSAIDs minimize caffeine.  If symptoms are ongoing or worsening, I recommended getting an EGD.  Previous H. pylori serology was negative.         Relevant Medications    famotidine (Pepcid) 20 MG tablet    Other Relevant Orders    Hemoglobin & Hematocrit, Blood       Medium    Hyperlipidemia (Chronic)    Current Assessment & Plan     Lab Results   Component Value Date     (H) 09/27/2022     (H) 08/22/2017     (H) 09/18/2015     Lab Results   Component Value Date    HDL 65 (H) 09/27/2022    HDL 69 (H) 08/22/2017    HDL 77 (H) 09/18/2015     Lab Results   Component Value Date    TRIG 91 09/27/2022    TRIG 132  "08/22/2017    TRIG 118 09/18/2015     Lab Results   Component Value Date    CHOLHDLRATIO 3.37 09/27/2022    CHOLHDLRATIO 2.97 08/22/2017    CHOLHDLRATIO 2.9 09/18/2015     Maintain low saturated fat and cholesterol diet.  Check fasting labs 1 week prior to follow-up in 6 months.         Relevant Orders    Lipid Panel With / Chol / HDL Ratio    Comprehensive Metabolic Panel       Low    Overweight (BMI 25.0-29.9) (Chronic)    Current Assessment & Plan     Wt Readings from Last 3 Encounters:   03/27/23 69.4 kg (153 lb)   09/27/22 74.8 kg (165 lb)   09/21/22 75.6 kg (166 lb 9.6 oz)     Body mass index is 26.26 kg/m².    Continue excellent weight loss efforts with lifestyle and dietary modifications.          Orders Placed This Encounter   Procedures   • Lipid Panel With / Chol / HDL Ratio   • Comprehensive Metabolic Panel   • Hemoglobin & Hematocrit, Blood     New Medications Ordered This Visit   Medications   • famotidine (Pepcid) 20 MG tablet     Sig: Take 1 tablet by mouth 2 (Two) Times a Day As Needed for Heartburn.     Dispense:  180 tablet     Refill:  3       SUMMARY/DISCUSSION  •       Next Appointment with me: Visit date not found    Return in about 6 months (around 9/27/2023) for Reassess chronic medical problems, FASTING LABS 1 WEEK PRIOR TO FOLLOWUP.      VITAL SIGNS     Vitals:    03/27/23 0816   BP: 154/90   Pulse: 66   Temp: 97.3 °F (36.3 °C)   SpO2: 96%   Weight: 69.4 kg (153 lb)   Height: 162.6 cm (64\")       BP Readings from Last 3 Encounters:   03/27/23 154/90   09/27/22 150/98   09/21/22 142/90     Wt Readings from Last 3 Encounters:   03/27/23 69.4 kg (153 lb)   09/27/22 74.8 kg (165 lb)   09/21/22 75.6 kg (166 lb 9.6 oz)     Body mass index is 26.26 kg/m².    Blood pressure readings recorded on patient flowsheet:    Time Systolic Diastolic Pulse   3/27/2023  6:45  78 66   3/26/2023  3:37  70 64   3/25/2023 10:55  81 74   3/22/2023  7:07  73 75   3/12/2023 12:31  75 73 " "  2/25/2023  4:46  84 95   2/22/2023  9:10  74 63   2/19/2023  9:36  81 69   2/10/2023  6:01  79 62   1/25/2023  7:03  84 73          MEDICATIONS AT THE TIME OF OFFICE VISIT     Current Outpatient Medications on File Prior to Visit   Medication Sig   • Calcium Carb-Cholecalciferol (Caltrate 600+D3 Soft) 600-800 MG-UNIT chewable tablet Chew 1 tablet 2 (Two) Times a Day.         HISTORY OF PRESENT ILLNESS     Her blood pressure remains very well controlled at home.  She has well-documented history of whitecoat syndrome.  Her blood pressure machine was verified for accuracy today and it is accurate.  Denies chest pain, headaches or vision change.  She took pantoprazole for 3 months which helped with her dyspepsia symptoms.  Previous H. pylori serology tests were negative.  Denies melena or blood in the stool.  She does still have intermittent dyspepsia symptoms without dysphagia or early satiety.  She has elevated LDL cholesterol and she states that recent lab results from work showed \"good\" results.  She does not take anything for cholesterol at this time.  She has made good dietary and lifestyle modifications that she has lost close to 12-15 pounds.      Patient Care Team:  Sanchez Perez MD as PCP - General (Internal Medicine)  Sanchez Perez MD as PCP - Family Medicine  Joey Sorenson MD as Consulting Physician (Obstetrics and Gynecology)    REVIEW OF SYSTEMS     Review of Systems   Constitutional neg except per HPI   Resp neg  CV neg   GI per HPI     PHYSICAL EXAMINATION     Physical Exam  General: No acute distress; weight loss noted   Psych: Normal thought and judgment   Cardiovascular Rate: normal. Rhythm: regular. Heart sounds: normal 1  Pulm/Chest: Effort normal, breath sounds normal.  Abdomen: Soft and nontender.       REVIEWED DATA     Labs:     H. Pylori serology negative in 2022     Lab Results   Component Value Date     09/27/2022    K 4.0 09/27/2022    CALCIUM 10.3 " 09/27/2022    AST 19 09/27/2022    ALT 19 09/27/2022    BUN 15 09/27/2022    CREATININE 0.86 09/27/2022    CREATININE 0.91 03/24/2022    CREATININE 0.88 08/22/2017    EGFRIFNONA 67 08/22/2017    EGFRIFAFRI 81 08/22/2017       Lab Results   Component Value Date    HGBA1C 5.60 09/27/2022    HGBA1C 5.40 08/22/2017    HGBA1C 5.4 09/18/2015       Lab Results   Component Value Date     (H) 09/27/2022     (H) 08/22/2017     (H) 09/18/2015    HDL 65 (H) 09/27/2022    HDL 69 (H) 08/22/2017    TRIG 91 09/27/2022    TRIG 132 08/22/2017       Lab Results   Component Value Date    TSH 1.240 09/27/2022    TSH 2.050 08/22/2017    TSH 1.60 09/18/2015    FREET4 1.17 09/27/2022    FREET4 1.19 08/22/2017    FREET4 1.22 09/18/2015       Lab Results   Component Value Date    WBC 6.61 09/27/2022    HGB 13.6 09/27/2022     09/27/2022       No results found for: MALBCRERATIO       Imaging:           Medical Tests:           Summary of old records / correspondence / consultant report:           Request outside records:             *Examiner was wearing [id number] mask during the entire duration of the visit. Patient was masked the entire time. Minimum social distance of 6 ft maintained entire visit except if physical contact was necessary as documented.       Template created by Peg Perez MD

## 2023-09-13 DIAGNOSIS — R10.13 DYSPEPSIA: Chronic | ICD-10-CM

## 2023-09-13 DIAGNOSIS — E78.5 HYPERLIPIDEMIA, UNSPECIFIED HYPERLIPIDEMIA TYPE: ICD-10-CM

## 2023-09-13 LAB
ALBUMIN SERPL-MCNC: 4.9 G/DL (ref 3.5–5.2)
ALBUMIN/GLOB SERPL: 2 G/DL
ALP SERPL-CCNC: 78 U/L (ref 39–117)
ALT SERPL-CCNC: 13 U/L (ref 1–33)
AST SERPL-CCNC: 16 U/L (ref 1–32)
BILIRUB SERPL-MCNC: 1.1 MG/DL (ref 0–1.2)
BUN SERPL-MCNC: 14 MG/DL (ref 8–23)
BUN/CREAT SERPL: 14.9 (ref 7–25)
CALCIUM SERPL-MCNC: 10.3 MG/DL (ref 8.6–10.5)
CHLORIDE SERPL-SCNC: 103 MMOL/L (ref 98–107)
CHOLEST SERPL-MCNC: 207 MG/DL (ref 0–200)
CHOLEST/HDLC SERPL: 2.96 {RATIO}
CO2 SERPL-SCNC: 26.7 MMOL/L (ref 22–29)
CREAT SERPL-MCNC: 0.94 MG/DL (ref 0.57–1)
EGFRCR SERPLBLD CKD-EPI 2021: 69.6 ML/MIN/1.73
GLOBULIN SER CALC-MCNC: 2.4 GM/DL
GLUCOSE SERPL-MCNC: 83 MG/DL (ref 65–99)
HCT VFR BLD AUTO: 40.1 % (ref 34–46.6)
HDLC SERPL-MCNC: 70 MG/DL (ref 40–60)
HGB BLD-MCNC: 13.3 G/DL (ref 12–15.9)
LDLC SERPL CALC-MCNC: 124 MG/DL (ref 0–100)
POTASSIUM SERPL-SCNC: 4.1 MMOL/L (ref 3.5–5.2)
PROT SERPL-MCNC: 7.3 G/DL (ref 6–8.5)
SODIUM SERPL-SCNC: 139 MMOL/L (ref 136–145)
TRIGL SERPL-MCNC: 70 MG/DL (ref 0–150)
VLDLC SERPL CALC-MCNC: 13 MG/DL (ref 5–40)

## 2023-09-20 ENCOUNTER — OFFICE VISIT (OUTPATIENT)
Dept: INTERNAL MEDICINE | Age: 58
End: 2023-09-20
Payer: COMMERCIAL

## 2023-09-20 VITALS
HEIGHT: 64 IN | SYSTOLIC BLOOD PRESSURE: 142 MMHG | WEIGHT: 147 LBS | BODY MASS INDEX: 25.1 KG/M2 | DIASTOLIC BLOOD PRESSURE: 90 MMHG | TEMPERATURE: 97.1 F | OXYGEN SATURATION: 97 % | HEART RATE: 72 BPM

## 2023-09-20 DIAGNOSIS — R10.13 DYSPEPSIA: Chronic | ICD-10-CM

## 2023-09-20 DIAGNOSIS — E66.3 OVERWEIGHT (BMI 25.0-29.9): Chronic | ICD-10-CM

## 2023-09-20 DIAGNOSIS — E78.5 HYPERLIPIDEMIA, UNSPECIFIED HYPERLIPIDEMIA TYPE: Primary | Chronic | ICD-10-CM

## 2023-09-20 DIAGNOSIS — Z00.00 ENCOUNTER FOR ANNUAL HEALTH EXAMINATION: ICD-10-CM

## 2023-09-20 DIAGNOSIS — R03.0 WHITE COAT SYNDROME WITH HIGH BLOOD PRESSURE BUT WITHOUT HYPERTENSION: Chronic | ICD-10-CM

## 2023-09-20 RX ORDER — MULTIPLE VITAMINS W/ MINERALS TAB 9MG-400MCG
1 TAB ORAL DAILY
COMMUNITY

## 2023-09-20 NOTE — ASSESSMENT & PLAN NOTE
Lab Results   Component Value Date     (H) 09/13/2023     (H) 09/27/2022     (H) 08/22/2017    TRIG 70 09/13/2023    CHOLHDLRATIO 2.96 09/13/2023      Maintain low saturated fat/cholesterol diet.  LDL cholesterol and HDL have improved with weight loss effort.

## 2023-09-20 NOTE — ASSESSMENT & PLAN NOTE
Wt Readings from Last 3 Encounters:   09/20/23 66.7 kg (147 lb)   03/27/23 69.4 kg (153 lb)   09/27/22 74.8 kg (165 lb)     Body mass index is 25.23 kg/m².     Good improvement with lifestyle changes. Continue same.

## 2023-09-20 NOTE — ASSESSMENT & PLAN NOTE
BP Readings from Last 3 Encounters:   09/20/23 142/90   03/27/23 154/90   09/27/22 150/98      Blood pressure readings recorded on patient flowsheet:  [facility] Blood Pressure Flowsheet Systolic Diastolic Pulse   9/20/2023   6:56  77 68   9/17/2023   3:13  79 69   9/16/2023   9:19  80 76   9/13/2023   6:51  82 68   9/12/2023   9:48  79 71   9/12/2023   6:51  82 60   9/9/2023  10:52  85 70   8/28/2023   9:33  82 72   8/3/2023   7:23  79 66   7/9/2023   9:08  80 70      Blood pressure is normal at home. Continue same.

## 2023-09-20 NOTE — PROGRESS NOTES
I N T E R N A L  M E D I C I N E    J U N O H  K I M,  M D      ENCOUNTER DATE:  09/20/2023    Autumn Bustoses / 58 y.o. / female    CHIEF COMPLAINT / REASON FOR OFFICE VISIT     White coat syndrome with high blood pressure but without hy and Hyperlipidemia      ASSESSMENT & PLAN     Problem List Items Addressed This Visit          High    White coat syndrome with high blood pressure but without hypertension (Chronic)    Current Assessment & Plan     BP Readings from Last 3 Encounters:   09/20/23 142/90   03/27/23 154/90   09/27/22 150/98      Blood pressure readings recorded on patient flowsheet:  [facility] Blood Pressure Flowsheet Systolic Diastolic Pulse   9/20/2023   6:56  77 68   9/17/2023   3:13  79 69   9/16/2023   9:19  80 76   9/13/2023   6:51  82 68   9/12/2023   9:48  79 71   9/12/2023   6:51  82 60   9/9/2023  10:52  85 70   8/28/2023   9:33  82 72   8/3/2023   7:23  79 66   7/9/2023   9:08  80 70      Blood pressure is normal at home. Continue same.          Dyspepsia (Chronic)    Current Assessment & Plan     Continue famotidine 20 mg qd          Relevant Medications    famotidine (Pepcid) 20 MG tablet       Medium    Hyperlipidemia - Primary (Chronic)    Current Assessment & Plan     Lab Results   Component Value Date     (H) 09/13/2023     (H) 09/27/2022     (H) 08/22/2017    TRIG 70 09/13/2023    CHOLHDLRATIO 2.96 09/13/2023      Maintain low saturated fat/cholesterol diet.  LDL cholesterol and HDL have improved with weight loss effort.          Relevant Orders    Lipid Panel With / Chol / HDL Ratio       Low    Overweight (BMI 25.0-29.9) (Chronic)    Current Assessment & Plan     Wt Readings from Last 3 Encounters:   09/20/23 66.7 kg (147 lb)   03/27/23 69.4 kg (153 lb)   09/27/22 74.8 kg (165 lb)   Body mass index is 25.23 kg/m².     Good improvement with lifestyle changes. Continue same.           Other Visit  "Diagnoses       Encounter for annual health examination        Relevant Orders    CBC & Differential    Comprehensive Metabolic Panel    Hemoglobin A1c    Lipid Panel With / Chol / HDL Ratio    TSH+Free T4    Urinalysis With Microscopic If Indicated (No Culture) - Urine, Clean Catch          Orders Placed This Encounter   Procedures    Comprehensive Metabolic Panel    Hemoglobin A1c    Lipid Panel With / Chol / HDL Ratio    TSH+Free T4    Urinalysis With Microscopic If Indicated (No Culture) - Urine, Clean Catch    CBC & Differential     No orders of the defined types were placed in this encounter.      SUMMARY/DISCUSSION        Next Appointment with me: Visit date not found    Return in about 1 year (around 9/20/2024) for ANNUAL PHYSICAL.      VITAL SIGNS     Vitals:    09/20/23 0802   BP: 142/90   Pulse: 72   Temp: 97.1 °F (36.2 °C)   SpO2: 97%   Weight: 66.7 kg (147 lb)   Height: 162.6 cm (64\")       BP Readings from Last 3 Encounters:   09/20/23 142/90   03/27/23 154/90   09/27/22 150/98     Wt Readings from Last 3 Encounters:   09/20/23 66.7 kg (147 lb)   03/27/23 69.4 kg (153 lb)   09/27/22 74.8 kg (165 lb)     Body mass index is 25.23 kg/m².    Blood pressure readings recorded on patient flowsheet:  [facility] Blood Pressure Flowsheet Systolic Diastolic Pulse   9/20/2023   6:56  77 68   9/17/2023   3:13  79 69   9/16/2023   9:19  80 76   9/13/2023   6:51  82 68   9/12/2023   9:48  79 71   9/12/2023   6:51  82 60   9/9/2023  10:52  85 70   8/28/2023   9:33  82 72   8/3/2023   7:23  79 66   7/9/2023   9:08  80 70         MEDICATIONS AT THE TIME OF OFFICE VISIT     Current Outpatient Medications on File Prior to Visit   Medication Sig    Calcium Carb-Cholecalciferol (Caltrate 600+D3 Soft) 600-800 MG-UNIT chewable tablet Chew 1 tablet 2 (Two) Times a Day.    famotidine (Pepcid) 20 MG tablet Take 1 tablet by mouth 2 (Two) Times a Day As Needed for Heartburn. " (Patient taking differently: Take 1 tablet by mouth 2 (Two) Times a Day As Needed for Heartburn. Take once a day)    multivitamin with minerals tablet tablet Take 1 tablet by mouth Daily.     No current facility-administered medications on file prior to visit.          HISTORY OF PRESENT ILLNESS     She is doing well and has no concerns today. Her blood pressure is elevated today but normal at home. Has white coat syndrome without hypertension. Her LDL and HDL have improved with noted weight loss and good lifestyle changes. Her weight is down 18 pounds and BMI is 25.23 kg/m2. She is taking rosuvastatin 10 mg daily for hyperlipidemia. Doing well with Pepcid 20 mg daily. She has cut out processed foods due to it exacerbating her symptoms.       Patient Care Team:  Sanchez Perez MD as PCP - General (Internal Medicine)  Sanchez Perez MD as PCP - Family Medicine  Joey Sorenson MD as Consulting Physician (Obstetrics and Gynecology)    REVIEW OF SYSTEMS     Review of Systems       PHYSICAL EXAMINATION     Physical Exam  General: No acute distress; weight loss noted   Psych: Normal thought and judgment   Cardiovascular Rate: normal. Rhythm: regular. Heart sounds: normal       REVIEWED DATA     Labs:     Lab Results   Component Value Date     09/13/2023    K 4.1 09/13/2023    CALCIUM 10.3 09/13/2023    AST 16 09/13/2023    ALT 13 09/13/2023    BUN 14 09/13/2023    CREATININE 0.94 09/13/2023    CREATININE 0.86 09/27/2022    CREATININE 0.91 03/24/2022    EGFRIFNONA 67 08/22/2017    EGFRIFAFRI 81 08/22/2017       Lab Results   Component Value Date    HGBA1C 5.60 09/27/2022    HGBA1C 5.40 08/22/2017    HGBA1C 5.4 09/18/2015       Lab Results   Component Value Date     (H) 09/13/2023     (H) 09/27/2022     (H) 08/22/2017    HDL 70 (H) 09/13/2023    HDL 65 (H) 09/27/2022    TRIG 70 09/13/2023    TRIG 91 09/27/2022       Lab Results   Component Value Date    TSH 1.240 09/27/2022    TSH 2.050 08/22/2017     TSH 1.60 09/18/2015    FREET4 1.17 09/27/2022    FREET4 1.19 08/22/2017    FREET4 1.22 09/18/2015       Lab Results   Component Value Date    WBC 6.61 09/27/2022    HGB 13.3 09/13/2023     09/27/2022       No results found for: MALBCRERATIO         Imaging:           Medical Tests:           Summary of old records / correspondence / consultant report:           Request outside records:     Transcribed from ambient dictation for Sanchez Perez MD by Joycelyn Queen.  09/20/23   09:18 EDT    Patient or patient representative verbalized consent to the visit recording.  I have personally performed the services described in this document as transcribed by the above individual, and it is both accurate and complete.  Sanchez Perez MD  9/20/2023  09:26 EDT

## 2023-10-03 ENCOUNTER — PROCEDURE VISIT (OUTPATIENT)
Dept: OBSTETRICS AND GYNECOLOGY | Facility: CLINIC | Age: 58
End: 2023-10-03
Payer: COMMERCIAL

## 2023-10-03 ENCOUNTER — OFFICE VISIT (OUTPATIENT)
Dept: OBSTETRICS AND GYNECOLOGY | Facility: CLINIC | Age: 58
End: 2023-10-03
Payer: COMMERCIAL

## 2023-10-03 VITALS
HEIGHT: 64 IN | WEIGHT: 148.4 LBS | BODY MASS INDEX: 25.33 KG/M2 | SYSTOLIC BLOOD PRESSURE: 124 MMHG | DIASTOLIC BLOOD PRESSURE: 74 MMHG

## 2023-10-03 DIAGNOSIS — Z12.31 VISIT FOR SCREENING MAMMOGRAM: Primary | ICD-10-CM

## 2023-10-03 DIAGNOSIS — Z01.419 ENCOUNTER FOR GYNECOLOGICAL EXAMINATION WITHOUT ABNORMAL FINDING: Primary | ICD-10-CM

## 2023-10-03 NOTE — PROGRESS NOTES
GYN Annual Exam     CC- Here for annual exam.     Autumn Geiger is a 58 y.o. female who presents for annual well woman exam. She is menopausal.  She is experiencing and/or reports no new or bothersome menopausal symptoms.  She denies postmenopausal vaginal bleeding.  She denies abdominal pain, diarrhea, and cough.  Today, she wishes to specifically address just routine screening exam today and mammogram today.  I explained to her that current ACOG guidelines state that screening Pap smears are no longer recommended after the age of 65, nor after hysterectomy for benign reasons.  Her son Joey is doing very well and working at [employer] and playing for different sports.  Her daughter Mary is doing very well in the insurance field.    OB History          2    Para   2    Term   2            AB        Living   2         SAB        IAB        Ectopic        Molar        Multiple        Live Births   2                Current contraception: post menopausal status  History of abnormal Pap smear: no  Family history of uterine, colon or ovarian cancer: no  History of abnormal mammogram: no  Family history of breast cancer: no  Last Pap :   Last mammogram: Done today  Last colonoscopy:   Last DEXA: Not yet done      Past Medical History:   Diagnosis Date    Hyperlipidemia 3/27/2023    Hyperthyroidism 2017    During pregnancy, resolved    Pneumonia     several times       Past Surgical History:   Procedure Laterality Date    BREAST CYST ASPIRATION      x 2    BREAST SURGERY      COLONOSCOPY      TONSILLECTOMY      WISDOM TOOTH EXTRACTION           Current Outpatient Medications:     Calcium Carb-Cholecalciferol (Caltrate 600+D3 Soft) 600-800 MG-UNIT chewable tablet, Chew 1 tablet 2 (Two) Times a Day., Disp: , Rfl:     famotidine (Pepcid) 20 MG tablet, Take 1 tablet by mouth 2 (Two) Times a Day As Needed for Heartburn. (Patient taking differently: Take 1 tablet by mouth 2 (Two) Times a Day As Needed  "for Heartburn. Take once a day), Disp: 180 tablet, Rfl: 3    multivitamin with minerals tablet tablet, Take 1 tablet by mouth Daily., Disp: , Rfl:     Allergies   Allergen Reactions    Erythromycin GI Intolerance     GI upset.       Social History     Tobacco Use    Smoking status: Never    Smokeless tobacco: Never   Vaping Use    Vaping Use: Never used   Substance Use Topics    Alcohol use: No    Drug use: No       Family History   Problem Relation Age of Onset    Hyperlipidemia Father     Hypertension Father     Heart disease Mother     Hyperlipidemia Mother     Hypertension Mother     Arthritis Mother     Diabetes Mother     Cancer Mother         small bowel cancer    Cirrhosis Mother     Clotting disorder Brother         PE    Hypothyroidism Brother     Melanoma Brother     Obesity Brother     Down syndrome Son     Breast cancer Neg Hx     Ovarian cancer Neg Hx     Uterine cancer Neg Hx     Colon cancer Neg Hx        Review of Systems   Constitutional:  Negative for fatigue and fever.   Respiratory:  Negative for cough.    Genitourinary:  Negative for urgency, urinary incontinence and vaginal bleeding.     /74   Ht 162.6 cm (64\")   Wt 67.3 kg (148 lb 6.4 oz)   BMI 25.47 kg/m²     Physical Exam  Constitutional:       Appearance: She is normal weight.   Genitourinary:      Bladder and urethral meatus normal.      No lesions in the vagina.      Right Labia: No lesions.     Left Labia: No lesions.     No vaginal discharge, tenderness or bleeding.      No vaginal prolapse present.     No vaginal atrophy present.       Right Adnexa: not tender, not full and no mass present.     Left Adnexa: not tender, not full and no mass present.     No cervical motion tenderness, discharge, friability or lesion.      Uterus is not enlarged, fixed or tender.      No uterine mass detected.     Uterus is midaxial.   Breasts:     Right: No mass, nipple discharge, skin change or tenderness.      Left: No mass, nipple discharge, " skin change or tenderness.   Neck:      Thyroid: No thyroid mass or thyromegaly.   Abdominal:      General: Abdomen is flat.      Palpations: Abdomen is soft. There is no mass.      Tenderness: There is no abdominal tenderness.   Neurological:      Mental Status: She is alert.   Vitals reviewed.           Assessment     1) GYN annual well woman exam.   2) normal annual exam today.  Mammogram done today.     Plan     1) Breast Health - Clinical breast exam & mammogram reviewed specifically American [employer] Society recommendations for screening specific to her, and Self breast awareness monthly  2) Pap -not indicated this year  3) Smoking status-negative  4) Colon health - screening colonoscopy recommended if not up to date  5) Bone health - Weight bearing exercise, dietary calcium recommendations and vitamin D reviewed.   6) Encouraged to be wary of information obtained via social media and internet based on source and search.   7) Follow up prn and one year      Joey Sorenson MD   10/3/2023  08:47 EDT

## 2023-10-06 ENCOUNTER — TELEPHONE (OUTPATIENT)
Dept: OBSTETRICS AND GYNECOLOGY | Facility: CLINIC | Age: 58
End: 2023-10-06
Payer: COMMERCIAL

## 2023-10-06 NOTE — TELEPHONE ENCOUNTER
----- Message from Joey Sorenson MD sent at 10/6/2023 10:00 AM EDT -----  Autumn, your recent mammogram was read as normal

## 2024-03-06 DIAGNOSIS — R10.13 DYSPEPSIA: Chronic | ICD-10-CM

## 2024-03-06 RX ORDER — FAMOTIDINE 20 MG/1
20 TABLET, FILM COATED ORAL 2 TIMES DAILY PRN
Qty: 180 TABLET | Refills: 1 | Status: SHIPPED | OUTPATIENT
Start: 2024-03-06

## 2024-09-18 DIAGNOSIS — Z00.00 ENCOUNTER FOR ANNUAL HEALTH EXAMINATION: ICD-10-CM

## 2024-09-18 DIAGNOSIS — E78.5 HYPERLIPIDEMIA, UNSPECIFIED HYPERLIPIDEMIA TYPE: Chronic | ICD-10-CM

## 2024-09-18 LAB
ALBUMIN SERPL-MCNC: 4.6 G/DL (ref 3.5–5.2)
ALBUMIN/GLOB SERPL: 1.8 G/DL
ALP SERPL-CCNC: 102 U/L (ref 39–117)
ALT SERPL-CCNC: 13 U/L (ref 1–33)
APPEARANCE UR: CLEAR
AST SERPL-CCNC: 21 U/L (ref 1–32)
BACTERIA #/AREA URNS HPF: ABNORMAL /HPF
BASOPHILS # BLD AUTO: 0.04 10*3/MM3 (ref 0–0.2)
BASOPHILS NFR BLD AUTO: 0.8 % (ref 0–1.5)
BILIRUB SERPL-MCNC: 1.5 MG/DL (ref 0–1.2)
BILIRUB UR QL STRIP: NEGATIVE
BUN SERPL-MCNC: 16 MG/DL (ref 8–23)
BUN/CREAT SERPL: 18.2 (ref 7–25)
CALCIUM SERPL-MCNC: 9.8 MG/DL (ref 8.6–10.5)
CASTS URNS MICRO: ABNORMAL
CHLORIDE SERPL-SCNC: 103 MMOL/L (ref 98–107)
CHOLEST SERPL-MCNC: 226 MG/DL (ref 0–200)
CHOLEST/HDLC SERPL: 3.48 {RATIO}
CO2 SERPL-SCNC: 26.9 MMOL/L (ref 22–29)
COLOR UR: YELLOW
CREAT SERPL-MCNC: 0.88 MG/DL (ref 0.57–1)
EGFRCR SERPLBLD CKD-EPI 2021: 74.9 ML/MIN/1.73
EOSINOPHIL # BLD AUTO: 0.1 10*3/MM3 (ref 0–0.4)
EOSINOPHIL NFR BLD AUTO: 1.9 % (ref 0.3–6.2)
EPI CELLS #/AREA URNS HPF: ABNORMAL /HPF
ERYTHROCYTE [DISTWIDTH] IN BLOOD BY AUTOMATED COUNT: 12.7 % (ref 12.3–15.4)
GLOBULIN SER CALC-MCNC: 2.5 GM/DL
GLUCOSE SERPL-MCNC: 89 MG/DL (ref 65–99)
GLUCOSE UR QL STRIP: NEGATIVE
HBA1C MFR BLD: 5.2 % (ref 4.8–5.6)
HCT VFR BLD AUTO: 43.9 % (ref 34–46.6)
HDLC SERPL-MCNC: 65 MG/DL (ref 40–60)
HGB BLD-MCNC: 13.9 G/DL (ref 12–15.9)
HGB UR QL STRIP: NEGATIVE
IMM GRANULOCYTES # BLD AUTO: 0.01 10*3/MM3 (ref 0–0.05)
IMM GRANULOCYTES NFR BLD AUTO: 0.2 % (ref 0–0.5)
KETONES UR QL STRIP: NEGATIVE
LDLC SERPL CALC-MCNC: 145 MG/DL (ref 0–100)
LEUKOCYTE ESTERASE UR QL STRIP: ABNORMAL
LYMPHOCYTES # BLD AUTO: 2.1 10*3/MM3 (ref 0.7–3.1)
LYMPHOCYTES NFR BLD AUTO: 39.5 % (ref 19.6–45.3)
MCH RBC QN AUTO: 29 PG (ref 26.6–33)
MCHC RBC AUTO-ENTMCNC: 31.7 G/DL (ref 31.5–35.7)
MCV RBC AUTO: 91.6 FL (ref 79–97)
MONOCYTES # BLD AUTO: 0.52 10*3/MM3 (ref 0.1–0.9)
MONOCYTES NFR BLD AUTO: 9.8 % (ref 5–12)
NEUTROPHILS # BLD AUTO: 2.55 10*3/MM3 (ref 1.7–7)
NEUTROPHILS NFR BLD AUTO: 47.8 % (ref 42.7–76)
NITRITE UR QL STRIP: NEGATIVE
NRBC BLD AUTO-RTO: 0 /100 WBC (ref 0–0.2)
PH UR STRIP: 6.5 [PH] (ref 5–8)
PLATELET # BLD AUTO: 268 10*3/MM3 (ref 140–450)
POTASSIUM SERPL-SCNC: 4.5 MMOL/L (ref 3.5–5.2)
PROT SERPL-MCNC: 7.1 G/DL (ref 6–8.5)
PROT UR QL STRIP: ABNORMAL
RBC # BLD AUTO: 4.79 10*6/MM3 (ref 3.77–5.28)
RBC #/AREA URNS HPF: ABNORMAL /HPF
SODIUM SERPL-SCNC: 140 MMOL/L (ref 136–145)
SP GR UR STRIP: 1.02 (ref 1–1.03)
T4 FREE SERPL-MCNC: 1.24 NG/DL (ref 0.92–1.68)
TRIGL SERPL-MCNC: 91 MG/DL (ref 0–150)
TSH SERPL DL<=0.005 MIU/L-ACNC: 1.61 UIU/ML (ref 0.27–4.2)
UROBILINOGEN UR STRIP-MCNC: ABNORMAL MG/DL
VLDLC SERPL CALC-MCNC: 16 MG/DL (ref 5–40)
WBC # BLD AUTO: 5.32 10*3/MM3 (ref 3.4–10.8)
WBC #/AREA URNS HPF: ABNORMAL /HPF

## 2024-09-25 ENCOUNTER — OFFICE VISIT (OUTPATIENT)
Dept: INTERNAL MEDICINE | Age: 59
End: 2024-09-25
Payer: COMMERCIAL

## 2024-09-25 VITALS
SYSTOLIC BLOOD PRESSURE: 123 MMHG | WEIGHT: 157 LBS | HEART RATE: 74 BPM | HEIGHT: 64 IN | TEMPERATURE: 97.3 F | BODY MASS INDEX: 26.8 KG/M2 | OXYGEN SATURATION: 98 % | DIASTOLIC BLOOD PRESSURE: 80 MMHG

## 2024-09-25 DIAGNOSIS — R03.0 WHITE COAT SYNDROME WITH HIGH BLOOD PRESSURE BUT WITHOUT HYPERTENSION: Chronic | ICD-10-CM

## 2024-09-25 DIAGNOSIS — E66.3 OVERWEIGHT (BMI 25.0-29.9): Chronic | ICD-10-CM

## 2024-09-25 DIAGNOSIS — R10.13 DYSPEPSIA: ICD-10-CM

## 2024-09-25 DIAGNOSIS — E78.00 PURE HYPERCHOLESTEROLEMIA: Chronic | ICD-10-CM

## 2024-09-25 DIAGNOSIS — Z00.00 ENCOUNTER FOR ANNUAL HEALTH EXAMINATION: Primary | ICD-10-CM

## 2024-09-25 PROCEDURE — 99396 PREV VISIT EST AGE 40-64: CPT | Performed by: INTERNAL MEDICINE

## 2024-09-25 PROCEDURE — 99214 OFFICE O/P EST MOD 30 MIN: CPT | Performed by: INTERNAL MEDICINE

## 2024-09-25 RX ORDER — PANTOPRAZOLE SODIUM 40 MG/1
40 TABLET, DELAYED RELEASE ORAL DAILY
Qty: 90 TABLET | Refills: 1 | Status: SHIPPED | OUTPATIENT
Start: 2024-09-25

## 2024-09-25 RX ORDER — PANTOPRAZOLE SODIUM 40 MG/1
40 TABLET, DELAYED RELEASE ORAL DAILY
Qty: 30 TABLET | Refills: 1 | Status: SHIPPED | OUTPATIENT
Start: 2024-09-25 | End: 2024-09-25

## 2024-10-04 ENCOUNTER — PROCEDURE VISIT (OUTPATIENT)
Dept: OBSTETRICS AND GYNECOLOGY | Facility: CLINIC | Age: 59
End: 2024-10-04
Payer: COMMERCIAL

## 2024-10-04 ENCOUNTER — OFFICE VISIT (OUTPATIENT)
Dept: OBSTETRICS AND GYNECOLOGY | Facility: CLINIC | Age: 59
End: 2024-10-04
Payer: COMMERCIAL

## 2024-10-04 VITALS
HEIGHT: 64 IN | HEART RATE: 69 BPM | DIASTOLIC BLOOD PRESSURE: 83 MMHG | WEIGHT: 158 LBS | BODY MASS INDEX: 26.98 KG/M2 | SYSTOLIC BLOOD PRESSURE: 138 MMHG

## 2024-10-04 DIAGNOSIS — Z01.419 ENCOUNTER FOR GYNECOLOGICAL EXAMINATION WITHOUT ABNORMAL FINDING: Primary | ICD-10-CM

## 2024-10-04 DIAGNOSIS — Z12.31 VISIT FOR SCREENING MAMMOGRAM: Primary | ICD-10-CM

## 2024-10-04 DIAGNOSIS — Z78.0 POSTMENOPAUSAL STATE: ICD-10-CM

## 2024-10-04 DIAGNOSIS — Z13.820 OSTEOPOROSIS SCREENING: ICD-10-CM

## 2024-10-04 NOTE — PROGRESS NOTES
GYN Annual Exam     CC- Here for annual exam. (Patient is here for her annual today, mammo today, pap 2022 neg, colon 2015.)     Autumn Geiger is a 59 y.o. female who presents for annual well woman exam. She  is menopausal.  She is experiencing and/or reports no new or bothersome menopausal symptoms..  She denies postmenopausal vaginal bleeding.  She denies abdominal pain, diarrhea, and cough.  Today, she wishes to specifically address just routine screening exam.  I did inform her today that I think she should have a baseline DEXA study for osteoporosis prevention..  I explained to her that current ACOG guidelines state that screening Pap smears are no longer recommended after the age of 65, nor after hysterectomy for benign reasons.  Her children are doing well and her daughter just had a baby 1 month ago.    OB History          2    Para   2    Term   2            AB        Living   2         SAB        IAB        Ectopic        Molar        Multiple        Live Births   2                Current contraception: post menopausal status  History of abnormal Pap smear: no  Family history of uterine, colon or ovarian cancer: no  History of abnormal mammogram: no  Family history of breast cancer: no  Last Pap :   Last mammogram:  done today.  Last colonoscopy:  and has one scheduled in December  Last DEXA: Not yet done and order placed today.      Past Medical History:   Diagnosis Date    Hyperlipidemia 3/27/2023    Hyperthyroidism 2017    During pregnancy, resolved    Pneumonia     several times       Past Surgical History:   Procedure Laterality Date    BREAST CYST ASPIRATION      x 2    BREAST SURGERY      COLONOSCOPY      TONSILLECTOMY      WISDOM TOOTH EXTRACTION           Current Outpatient Medications:     Calcium Carb-Cholecalciferol (Caltrate 600+D3 Soft) 600-800 MG-UNIT chewable tablet, Chew 1 tablet 2 (Two) Times a Day., Disp: , Rfl:     multivitamin with minerals tablet  "tablet, Take 1 tablet by mouth Daily., Disp: , Rfl:     pantoprazole (PROTONIX) 40 MG EC tablet, TAKE 1 TABLET BY MOUTH DAILY (Patient not taking: Reported on 10/4/2024), Disp: 90 tablet, Rfl: 1    Allergies   Allergen Reactions    Erythromycin GI Intolerance     GI upset.       Social History     Tobacco Use    Smoking status: Never    Smokeless tobacco: Never   Vaping Use    Vaping status: Never Used   Substance Use Topics    Alcohol use: No    Drug use: No       Family History   Problem Relation Age of Onset    Heart disease Mother     Hyperlipidemia Mother     Hypertension Mother     Arthritis Mother     Diabetes Mother     Cancer Mother         small bowel cancer    Cirrhosis Mother     Hyperlipidemia Father     Hypertension Father     Diverticulitis Father     Clotting disorder Brother         PE    Hypothyroidism Brother     Melanoma Brother     Obesity Brother     Down syndrome Son     No Known Problems Sister         fraternal twin    No Known Problems Daughter     Breast cancer Neg Hx     Ovarian cancer Neg Hx     Uterine cancer Neg Hx     Colon cancer Neg Hx        Review of Systems   Constitutional:  Negative for fatigue and fever.   Genitourinary:  Negative for pelvic pain, urinary incontinence and vaginal bleeding.       /83   Pulse 69   Ht 162.6 cm (64\")   Wt 71.7 kg (158 lb)   BMI 27.12 kg/m²     Physical Exam  Constitutional:       Appearance: She is normal weight.   Genitourinary:      Bladder and urethral meatus normal.      No lesions in the vagina.      Right Labia: No lesions.     Left Labia: No lesions.     No vaginal discharge, tenderness or bleeding.      No vaginal prolapse present.     No vaginal atrophy present.       Right Adnexa: not tender, not full and no mass present.     Left Adnexa: not tender, not full and no mass present.     No cervical motion tenderness, discharge, friability or lesion.      No parametrium nodularity present.     Uterus is not enlarged, fixed or " tender.      No uterine mass detected.     Uterus is midaxial.   Breasts:     Right: No mass, nipple discharge, skin change or tenderness.      Left: No mass, nipple discharge, skin change or tenderness.   Abdominal:      General: Abdomen is flat.      Palpations: Abdomen is soft. There is no mass.      Tenderness: There is no abdominal tenderness.   Neurological:      Mental Status: She is alert.   Vitals reviewed.             Assessment     1) GYN annual well woman exam.   2) normal GYN annual.  Will order baseline DEXA study.     Plan     1) Breast Health - Clinical breast exam & mammogram reviewed specifically American Cancer Society recommendations for screening specific to her, and Self breast awareness monthly  2) Pap -done today  3) Smoking status-negative  4) Colon health - screening colonoscopy recommended if not up to date  5) Bone health - Weight bearing exercise, dietary calcium recommendations and vitamin D reviewed.   6) Encouraged to be wary of information obtained via social media and internet based on source and search.   7) Follow up prn and one year      Joey Sorenson MD   10/4/2024  09:29 EDT

## 2024-10-10 LAB
CYTOLOGIST CVX/VAG CYTO: NORMAL
CYTOLOGY CVX/VAG DOC CYTO: NORMAL
CYTOLOGY CVX/VAG DOC THIN PREP: NORMAL
DX ICD CODE: NORMAL
HPV GENOTYPE REFLEX: NORMAL
HPV I/H RISK 4 DNA CVX QL PROBE+SIG AMP: NEGATIVE
Lab: NORMAL
OTHER STN SPEC: NORMAL
STAT OF ADQ CVX/VAG CYTO-IMP: NORMAL

## 2024-11-25 ENCOUNTER — HOSPITAL ENCOUNTER (OUTPATIENT)
Dept: PET IMAGING | Facility: HOSPITAL | Age: 59
Discharge: HOME | End: 2024-11-25
Admitting: OBSTETRICS & GYNECOLOGY
Payer: COMMERCIAL

## 2024-11-25 DIAGNOSIS — Z13.820 OSTEOPOROSIS SCREENING: ICD-10-CM

## 2024-11-25 DIAGNOSIS — Z78.0 POSTMENOPAUSAL STATE: ICD-10-CM

## 2024-11-25 PROCEDURE — 77080 DXA BONE DENSITY AXIAL: CPT

## 2024-11-25 NOTE — PROGRESS NOTES
Autumn, your recent bone density study returned showing osteoporosis in the spine.  It does meet criteria for medical treatment.  You could set up an appointment here or feel free to discuss with your primary care provider the treatment options.

## 2024-12-18 ENCOUNTER — OFFICE VISIT (OUTPATIENT)
Dept: OBSTETRICS AND GYNECOLOGY | Facility: CLINIC | Age: 59
End: 2024-12-18
Payer: COMMERCIAL

## 2024-12-18 VITALS
BODY MASS INDEX: 27.83 KG/M2 | SYSTOLIC BLOOD PRESSURE: 130 MMHG | DIASTOLIC BLOOD PRESSURE: 84 MMHG | WEIGHT: 163 LBS | HEIGHT: 64 IN | HEART RATE: 71 BPM

## 2024-12-18 DIAGNOSIS — M81.6 LOCALIZED OSTEOPOROSIS WITHOUT CURRENT PATHOLOGICAL FRACTURE: Primary | ICD-10-CM

## 2024-12-18 NOTE — Clinical Note
Phone call to notify patient that I reviewed her recent lab studies.  I would recommend that we get an updated complete metabolic panel along with vitamin D and phosphorus levels.  I have placed the requisition.
Patient/Caregiver provided printed discharge information.

## 2024-12-18 NOTE — PROGRESS NOTES
REASON FOR FOLLOWUP/CHIEF COMPLAINT: (Patient is here to discuss Dexa scan)      HISTORY OF PRESENT ILLNESS: Patient here to discuss recent bone density study showing osteoporosis.  Findings:  According to the World Health Organization criteria, this is classified as osteoporosis.     The T-score at the femoral neck is -1.6.  The T-score for the total spine is -3.4.        Using the FRAX scores, which utilized risk factors and femoral neck bone density:  The 10 year probability of major osteoporotic fracture is 8.6%.  The 10 year probability of a hip fracture is 0.8%.     IMPRESSION:  Impression:     Osteoporosis.    We discussed the finding and no obvious risk factors other than being menopausal state.  We discussed the risk of progression and the availability of pharmaceuticals which are FDA approved to treat osteoporosis and lower the risk of fracture related to that.  She is concerned about side effects of any medication in general and strongly wishes to take a nonpharmaceutical approach at this time.  We discussed importance of diet and weightbearing exercise and vitamin D and calcium intake/supplementation.  We discussed a plan of making a physical therapy referral specifically for osteoporosis weightbearing exercise coaching and then reevaluating the DEXA study in 2 years.  She understands the availability of medication such as Fosamax, Boniva, Actonel and specifically Prolia which is what I recommended today.    Past Medical History, Past Surgical History, Social History, Family History have been reviewed and are without significant changes except as mentioned.    Review of Systems   Review of Systems   Constitutional:  Negative for unexpected weight change.   Musculoskeletal:  Negative for arthralgias.       Medications:  The current medication list was reviewed in the EMR    ALLERGIES:    Allergies   Allergen Reactions    Erythromycin GI Intolerance     GI upset.              Vitals:    12/18/24  "1445   BP: 130/84   Pulse: 71   Weight: 73.9 kg (163 lb)   Height: 162.6 cm (64\")     Physical Exam    CONSTITUTIONAL:  Vital signs reviewed.  No distress, looks comfortable.    PSYCHIATRIC:  Normal judgment and insight.  Normal mood and affect.       RECENT LABS:        ASSESSMENT/PLAN:  Autumn Geiger [unfilled]   1.  Osteoporosis of the lumbar spine.  Will check updated calcium level and vitamin D and phosphorus levels.  Will make referral for physical therapy.  Will plan to repeat DEXA study in 2 years to gauge stability/progression.  "

## 2024-12-20 ENCOUNTER — LAB (OUTPATIENT)
Dept: OBSTETRICS AND GYNECOLOGY | Facility: CLINIC | Age: 59
End: 2024-12-20
Payer: COMMERCIAL

## 2024-12-20 DIAGNOSIS — M81.6 LOCALIZED OSTEOPOROSIS WITHOUT CURRENT PATHOLOGICAL FRACTURE: ICD-10-CM

## 2024-12-20 LAB
25(OH)D3+25(OH)D2 SERPL-MCNC: 35.9 NG/ML (ref 30–100)
ALBUMIN SERPL-MCNC: 4.2 G/DL (ref 3.5–5.2)
ALBUMIN/GLOB SERPL: 1.6 G/DL
ALP SERPL-CCNC: 93 U/L (ref 39–117)
ALT SERPL-CCNC: 19 U/L (ref 1–33)
AST SERPL-CCNC: 20 U/L (ref 1–32)
BILIRUB SERPL-MCNC: 1.2 MG/DL (ref 0–1.2)
BUN SERPL-MCNC: 17 MG/DL (ref 8–23)
BUN/CREAT SERPL: 18.9 (ref 7–25)
CALCIUM SERPL-MCNC: 9.6 MG/DL (ref 8.6–10.5)
CHLORIDE SERPL-SCNC: 104 MMOL/L (ref 98–107)
CO2 SERPL-SCNC: 28.2 MMOL/L (ref 22–29)
CREAT SERPL-MCNC: 0.9 MG/DL (ref 0.57–1)
EGFRCR SERPLBLD CKD-EPI 2021: 72.9 ML/MIN/1.73
GLOBULIN SER CALC-MCNC: 2.6 GM/DL
GLUCOSE SERPL-MCNC: 87 MG/DL (ref 65–99)
PHOSPHATE SERPL-MCNC: 3.7 MG/DL (ref 2.5–4.5)
POTASSIUM SERPL-SCNC: 4.2 MMOL/L (ref 3.5–5.2)
PROT SERPL-MCNC: 6.8 G/DL (ref 6–8.5)
SODIUM SERPL-SCNC: 140 MMOL/L (ref 136–145)

## 2024-12-31 ENCOUNTER — PREP FOR SURGERY (OUTPATIENT)
Dept: SURGERY | Facility: SURGERY CENTER | Age: 59
End: 2024-12-31
Payer: COMMERCIAL

## 2024-12-31 ENCOUNTER — OFFICE VISIT (OUTPATIENT)
Dept: GASTROENTEROLOGY | Facility: CLINIC | Age: 59
End: 2024-12-31
Payer: COMMERCIAL

## 2024-12-31 VITALS
OXYGEN SATURATION: 97 % | DIASTOLIC BLOOD PRESSURE: 76 MMHG | HEART RATE: 70 BPM | WEIGHT: 163.2 LBS | BODY MASS INDEX: 27.86 KG/M2 | TEMPERATURE: 97.5 F | HEIGHT: 64 IN | SYSTOLIC BLOOD PRESSURE: 130 MMHG

## 2024-12-31 DIAGNOSIS — Z12.11 ENCOUNTER FOR SCREENING FOR MALIGNANT NEOPLASM OF COLON: ICD-10-CM

## 2024-12-31 DIAGNOSIS — R10.12 LEFT UPPER QUADRANT ABDOMINAL PAIN: Primary | ICD-10-CM

## 2024-12-31 PROCEDURE — 99203 OFFICE O/P NEW LOW 30 MIN: CPT | Performed by: NURSE PRACTITIONER

## 2024-12-31 RX ORDER — SODIUM CHLORIDE, SODIUM LACTATE, POTASSIUM CHLORIDE, CALCIUM CHLORIDE 600; 310; 30; 20 MG/100ML; MG/100ML; MG/100ML; MG/100ML
30 INJECTION, SOLUTION INTRAVENOUS CONTINUOUS PRN
OUTPATIENT
Start: 2024-12-31 | End: 2024-12-31

## 2024-12-31 RX ORDER — SODIUM CHLORIDE 0.9 % (FLUSH) 0.9 %
10 SYRINGE (ML) INJECTION AS NEEDED
OUTPATIENT
Start: 2024-12-31

## 2024-12-31 RX ORDER — SODIUM CHLORIDE 0.9 % (FLUSH) 0.9 %
3 SYRINGE (ML) INJECTION EVERY 12 HOURS SCHEDULED
OUTPATIENT
Start: 2024-12-31

## 2024-12-31 NOTE — PROGRESS NOTES
"Chief Complaint   Patient presents with    Abdominal Pain         History of Present Illness  Patient is a 59-year-old female who presents today for Evaluation, referred for dyspepsia.    Patient presents today for evaluation.  She reports over the last 2 years she has been experiencing intermittent abdominal pain.  Pain is located to the left upper quadrant and described as a burning sensation.  She took Pepcid for a period of time which did seem to help and Tums also help.  She was given a prescription for pantoprazole however developed hives after starting it, had also taken the Tdap vaccine that day, is uncertain which caused the hives.    She reports overall the burning has improved but it continues to recur around twice per week.  Tums helps it almost immediately.  She denies any nausea or vomiting.  Notes occasional heartburn.    Denies any bowel complaints.  She has had no prior abdominal surgeries.     Result Review :       Converted Surgical Pathology (11/12/2015 10:24)    SCANNED - COLONOSCOPY (11/12/2015)    Comprehensive Metabolic Panel (09/18/2024 07:57)    CBC & Differential (09/18/2024 07:57)    Office Visit with Sanchez Perez MD (09/25/2024)    Referral to Gastroenterology for Dyspepsia (09/25/2024)    Vital Signs:   /76   Pulse 70   Temp 97.5 °F (36.4 °C)   Ht 162.6 cm (64\")   Wt 74 kg (163 lb 3.2 oz)   SpO2 97%   BMI 28.01 kg/m²     Body mass index is 28.01 kg/m².     Physical Exam  Vitals reviewed.   Constitutional:       General: She is not in acute distress.     Appearance: She is well-developed.   HENT:      Head: Normocephalic and atraumatic.   Pulmonary:      Effort: Pulmonary effort is normal. No respiratory distress.   Abdominal:      General: Abdomen is flat. Bowel sounds are normal. There is no distension.      Palpations: Abdomen is soft.      Tenderness: There is no abdominal tenderness.   Skin:     General: Skin is dry.      Coloration: Skin is not pale.   Neurological:     "  Mental Status: She is alert and oriented to person, place, and time.   Psychiatric:         Thought Content: Thought content normal.           Assessment and Plan    Diagnoses and all orders for this visit:    1. Left upper quadrant abdominal pain (Primary)    2. Encounter for screening for malignant neoplasm of colon         Discussion  Patient presents today for evaluation with concerns about burning left upper quadrant pain.  Recommended EGD for further evaluation to assess for any evidence of peptic ulcer disease, gastritis, H. pylori infection that could be contributing to symptoms.  She is due in 2025 for colonoscopy for screening purposes and we will arrange for EGD and colonoscopy to be performed together.  Reviewed dietary modifications to help with dyspeptic issues at today's office visit.    If EGD is unremarkable and pain persist, we may consider CT scan.          Follow Up   Return for Follow up to review results after testing complete.    Patient Instructions   Schedule EGD for further evaluation of symptoms.     Schedule colonoscopy for colon cancer screening.    Okay to continue using Pepcid or Tums as needed for symptoms.    Avoid foods that can trigger symptoms which may include citrus fruits, spicy foods, tomatoes and red sauces, chocolate, coffee/tea, caffeinated or carbonated beverages, alcohol.

## 2024-12-31 NOTE — PATIENT INSTRUCTIONS
Schedule EGD for further evaluation of symptoms.     Schedule colonoscopy for colon cancer screening.    Okay to continue using Pepcid or Tums as needed for symptoms.    Avoid foods that can trigger symptoms which may include citrus fruits, spicy foods, tomatoes and red sauces, chocolate, coffee/tea, caffeinated or carbonated beverages, alcohol.

## 2025-01-08 ENCOUNTER — HOSPITAL ENCOUNTER (OUTPATIENT)
Dept: PHYSICAL THERAPY | Facility: HOSPITAL | Age: 60
Setting detail: THERAPIES SERIES
Discharge: HOME | End: 2025-01-08
Payer: COMMERCIAL

## 2025-01-08 DIAGNOSIS — M81.6 LOCALIZED OSTEOPOROSIS WITHOUT CURRENT PATHOLOGICAL FRACTURE: Primary | ICD-10-CM

## 2025-01-08 DIAGNOSIS — R53.1 GENERALIZED WEAKNESS: ICD-10-CM

## 2025-01-08 PROCEDURE — 97110 THERAPEUTIC EXERCISES: CPT

## 2025-01-08 PROCEDURE — 97161 PT EVAL LOW COMPLEX 20 MIN: CPT

## 2025-01-08 NOTE — THERAPY EVALUATION
Outpatient Physical Therapy Ortho Initial Evaluation  [facility]     Patient Name: Autumn Geiger  : 1965  MRN: [id number]  Today's Date: 2025      Visit Date: 2025    Patient Active Problem List   Diagnosis    White coat syndrome with high blood pressure but without hypertension    AR (allergic rhinitis)    Dyspepsia    Hyperlipidemia    Overweight (BMI 25.0-29.9)    Left upper quadrant abdominal pain    Encounter for screening for malignant neoplasm of colon        Past Medical History:   Diagnosis Date    Hyperlipidemia 3/27/2023    Hyperthyroidism 2017    During pregnancy, resolved    Pneumonia     several times        Past Surgical History:   Procedure Laterality Date    BREAST CYST ASPIRATION      x 2    BREAST SURGERY      COLONOSCOPY  2016    TONSILLECTOMY      WISDOM TOOTH EXTRACTION         Visit Dx:     ICD-10-CM ICD-9-CM   1. Localized osteoporosis without current pathological fracture  M81.6 733.09   2. Generalized weakness  R53.1 780.79          Patient History       Row Name 25 0800             History    Chief Complaint Other 1 (comment)  -JS      Brief Description of Current Complaint Autumn Geiger is a 59 y.o. female who presents to with reports of recently being diagonosised with osteoporosis following bone scan. She denies joint pain and wishes to focus on her strength training and learn concervative treatment methods to improve her bone density.  She reports sedentary occupation but is relatively active throughout her daily life.  Reports adult son with Down syndrome and motivated to remain active so she is able to assist him.  -JS      Patient/Caregiver Goals Other (comment)  -JS      Patient/Caregiver Goals Comment Build bone density  -JS      Hand Dominance right-handed  -JS      Occupation/sports/leisure activities Desk work, walking, cycling  -JS      Patient seeing anyone else for problem(s)? No  -JS      What clinical tests have you had for this  problem? Bone Density  -JS      Are you or can you be pregnant No  -JS         Pain     Pain Location --  denies pain  -JS         Fall Risk Assessment    Any falls in the past year: No  -JS         Services    Prior Rehab/Home Health Experiences No  -JS      Are you currently receiving Home Health services No  -JS      Do you plan to receive Home Health services in the near future No  -JS         Daily Activities    Primary Language English  -JS      Are you able to read Yes  -JS      Are you able to write Yes  -JS      How does patient learn best? Demonstration  -JS         Safety    Are you being hurt, hit, or frightened by anyone at home or in your life? No  -JS      Are you being neglected by a caregiver No  -JS      Have you had any of the following issues with N/A  -JS                User Key  (r) = Recorded By, (t) = Taken By, (c) = Cosigned By      Initials Name Provider Type    Emilia Asher, DEN Physical Therapist                     PT Ortho       Row Name 01/08/25 0800       MMT (Manual Muscle Testing)    Rt Upper Ext Rt Shoulder Flexion;Rt Shoulder ABduction;Rt Shoulder Internal Rotation;Rt Shoulder External Rotation;Rt Elbow Flexion;Rt Elbow Extension;Rt Wrist Extension;Rt Wrist Flexion  -JS    Lt Upper Ext Lt Shoulder Flexion;Lt Shoulder ABduction;Lt Shoulder Internal Rotation;Lt Shoulder External Rotation;Lt Elbow Extension;Lt Elbow Flexion;Lt Wrist Flexion;Lt Wrist Extension  -JS    Rt Lower Ext Rt Hip Flexion;Rt Hip Extension;Rt Hip ABduction;Rt Hip Internal (Medial) Rotation;Rt Hip External (Lateral) Rotation;Rt Knee Extension;Rt Knee Flexion;Rt Ankle Dorsiflexion  -JS    Lt Lower Ext Lt Hip Flexion;Lt Hip Extension;Lt Hip ABduction;Lt Hip Internal (Medial) Rotation;Lt Hip External (Lateral) Rotation;Lt Knee Extension;Lt Knee Flexion;Lt Ankle Dorsiflexion  -JS       MMT Right Upper Ext    Rt Shoulder Flexion MMT, Gross Movement (4/5) good  -JS    Rt Shoulder ABduction MMT, Gross Movement  (4/5) good  -JS    Rt Shoulder Internal Rotation MMT, Gross Movement (5/5) normal  -JS    Rt Shoulder External Rotation MMT, Gross Movement (4/5) good  -JS    Rt Elbow Flexion MMT, Gross Movement: (4+/5) good plus  -JS    Rt Elbow Extension MMT, Gross Movement: (4/5) good  -JS    Rt Wrist Flexion MMT, Gross Movement (5/5) normal  -JS    Rt Wrist Extension MMT, Gross Movement (5/5) normal  -JS       MMT Left Upper Ext    Lt Shoulder Flexion MMT, Gross Movement (4/5) good  -JS    Lt Shoulder ABduction MMT, Gross Movement (4/5) good  -JS    Lt Shoulder Internal Rotation MMT, Gross Movement (5/5) normal  -JS    Lt Shoulder External Rotation MMT, Gross Movement (4+/5) good plus  -JS    Lt Elbow Flexion MMT, Gross Movement (4+/5) good plus  -JS    Lt Elbow Extension MMT, Gross Movement (4/5) good  -JS    Lt Wrist Flexion MMT, Gross Movement (5/5) normal  -JS    Lt Wrist Extension MMT, Gross Movement (5/5) normal  -JS       MMT Right Lower Ext    Rt Hip Flexion MMT, Gross Movement (4/5) good  -JS    Rt Hip Extension MMT, Gross Movement (4-/5) good minus  -JS    Rt Hip ABduction MMT, Gross Movement (4-/5) good minus  -JS    Rt Hip Internal (Medial) Rotation MMT, Gross Movement (4-/5) good minus  -JS    Rt Hip External (Lateral) Rotation MMT, Gross Movement (4-/5) good minus  -JS    Rt Knee Extension MMT, Gross Movement (4+/5) good plus  -JS    Rt Knee Flexion MMT, Gross Movement (4/5) good  -JS    Rt Ankle Dorsiflexion MMT, Gross Movement (5/5) normal  -JS       MMT Left Lower Ext    Lt Hip Flexion MMT, Gross Movement (4/5) good  -JS    Lt Hip Extension MMT, Gross Movement (4-/5) good minus  -JS    Lt Hip ABduction MMT, Gross Movement (4-/5) good minus  -JS    Lt Hip Internal (Medial) Rotation MMT, Gross Movement (4-/5) good minus  -JS    Lt Hip External (Lateral) Rotation MMT, Gross Movement (4-/5) good minus  -JS    Lt Knee Extension MMT, Gross Movement (4+/5) good plus  -JS    Lt Knee Flexion MMT, Gross Movement (4/5)  good  -JS    Lt Ankle Dorsiflexion MMT, Gross Movement (5/5) normal  -JS              User Key  (r) = Recorded By, (t) = Taken By, (c) = Cosigned By      Initials Name Provider Type    Emilia Asher, PT Physical Therapist                                Therapy Education  Education Details: Educated on anatomy/pathology, evaluation findings, therapy expectations, benefit of circuit style training, POC and HEP Access Code [email or web address]  Given: HEP, Symptoms/condition management, Pain management, Posture/body mechanics  Program: New  How Provided: Verbal, Demonstration, Written  Provided to: Patient  Level of Understanding: Verbalized, Demonstrated  32113 - PT Self Care/Mgmt Minutes: 5      PT OP Goals       Row Name 01/08/25 0800          PT Short Term Goals    STG Date to Achieve 02/07/25  -JS     STG 1 Pt will be independent with initial HEP to improve strength and static/dynamic balance.  -JS     STG 1 Progress New  -JS     STG 2 Patient will demonstrates improved strength of B hip strength to 4/5 for improved functional strength.  -JS     STG 2 Progress New  -JS     STG 3 Pt performs 30 seconds sit to stand having complete at least 3 more repetitions that was performed upon initial evaluation for progression of ease with functional transfers, LE strength, and safety in the home.  -JS     STG 3 Progress New  -JS        Long Term Goals    LTG Date to Achieve 03/09/25  -JS     LTG 1 Pt will be independent with advance HEP to improve strength and static/dynamic balance.  -JS     LTG 1 Progress New  -JS     LTG 2 Patient will demonstrates improved strength of B hip strength to 4+/5 for improved functional strength.  -JS     LTG 2 Progress New  -JS     LTG 3 Pt performs 30 seconds sit to stand having complete at least 5 more repetitions that was performed upon initial evaluation for progression of ease with functional transfers, LE strength, and safety in the home  -JS     LTG 3 Progress New  -JS     LTG 4 Pt  demonstrates ability to maintain SLS on stable surface without loss of balance for > 20 seconds for improvements in gait with greater ability to weight shift, longer stride.  -JS     LTG 4 Progress New  -JS        Time Calculation    PT Goal Re-Cert Due Date 04/08/25  -JS               User Key  (r) = Recorded By, (t) = Taken By, (c) = Cosigned By      Initials Name Provider Type    JS Emilia Rodriguez, PT Physical Therapist                     PT Assessment/Plan       Row Name 01/08/25 0800          PT Assessment    Functional Limitations Limitations in functional capacity and performance;Performance in leisure activities;Performance in sport activities;Limitations in community activities  -JS     Impairments Balance;Endurance;Muscle strength;Poor body mechanics  -JS     Assessment Comments Autumn Geiger is a 59 y.o. female referred to physical therapy for osteoporosis and wishes to initiate strength training to improve quality of bone density. She presents with a stable clinical presentation, along with no remarkable comorbidities and personal factors of sedentary lifestyle  that may impact her progress in the plan of care. Pt presents today with UE/LE strength deficits, greatest with B hip strength and dec endurance.  Her signs and symptoms are consistent with referring diagnosis. Patient will benefit from circuit style strength training to address previously mentioned deficits. Pt will benefit from skilled PT to address the previous impairments and return to PLOF.  -JS     Please refer to paper survey for additional self-reported information Yes  -JS     Rehab Potential Good  -JS     Patient/caregiver participated in establishment of treatment plan and goals Yes  -JS     Patient would benefit from skilled therapy intervention Yes  -JS        PT Plan    PT Frequency 1x/week  -JS     Predicted Duration of Therapy Intervention (PT) 8-10 visits  -JS     Planned CPT's? PT EVAL LOW COMPLEXITY: 66478;PT RE-EVAL:  87586;PT THER PROC EA 15 MIN: 72514;PT THER ACT EA 15 MIN: 94193;PT MANUAL THERAPY EA 15 MIN: 76943;PT NEUROMUSC RE-EDUCATION EA 15 MIN: 50384;PT SELF CARE/HOME MGMT/TRAIN EA 15: 57791;PT HOT OR COLD PACK TREAT MCARE  -JS     PT Plan Comments Response to eval, initiate circuit style training, consider alternating UE/LE with core.  Circuit 1: bicep curl to OH, step up + knee , lateral stepping, AR press. Circuit 2: tandem walking, tadem stance shoulder ext, STS with weight, lunge  -JS               User Key  (r) = Recorded By, (t) = Taken By, (c) = Cosigned By      Initials Name Provider Type    Emilia Asher, DEN Physical Therapist                       OP Exercises       Row Name 01/08/25 0800             Subjective    Subjective Comments eval  -JS         Total Minutes    69571 - PT Therapeutic Exercise Minutes 25  -JS         Exercise 1    Exercise Name 1 bike  -JS      Additional Comments next visit  -JS         Exercise 2    Exercise Name 2 Bridges  -JS      Sets 2 2  -JS      Reps 2 10  -JS      Additional Comments RTB  -JS         Exercise 3    Exercise Name 3 Clamshells  -JS      Sets 3 2  -JS      Reps 3 10  -JS      Additional Comments RTB  -JS         Exercise 4    Additional Comments RTB  -JS         Exercise 5    Exercise Name 5 B shoulder HA  -JS      Sets 5 2  -JS      Reps 5 10  -JS      Additional Comments RTB  -JS         Exercise 6    Exercise Name 6 lateral stepping  -JS      Reps 6 3 laps  -JS      Additional Comments RTB  -JS         Exercise 7    Exercise Name 7 STS  -JS      Reps 7 10  -JS      Additional Comments RTB  -JS                User Key  (r) = Recorded By, (t) = Taken By, (c) = Cosigned By      Initials Name Provider Type    Emilia Asher, PT Physical Therapist                                  Outcome Measure Options: 30 Second Chair Stand Test, Lower Extremity Functional Scale (LEFS)  30 Second Chair Stand Test  30 Second Chair Stand Test: 12 (clinic chair no UE  support)         Time Calculation:     Start Time: 0830  Stop Time: 0915  Time Calculation (min): 45 min  Timed Charges  88729 - PT Therapeutic Exercise Minutes: 25  33032 - PT Self Care/Mgmt Minutes: 5  Untimed Charges  PT Eval/Re-eval Minutes: 10  Total Minutes  Timed Charges Total Minutes: 30  Untimed Charges Total Minutes: 10   Total Minutes: 40     Therapy Charges for Today       Code Description Service Date Service Provider Modifiers Qty    55124401473 HC PT THER PROC EA 15 MIN 1/8/2025 Emilia Rodriguez, PT GP 2    85462020119 HC PT EVAL LOW COMPLEXITY 1 1/8/2025 Emilia Rodriguez, PT GP 1            PT G-Codes  Outcome Measure Options: 30 Second Chair Stand Test, Lower Extremity Functional Scale (LEFS)  Total: 80         Emilia Rodriguez PT  1/8/2025

## 2025-01-17 ENCOUNTER — HOSPITAL ENCOUNTER (OUTPATIENT)
Dept: PHYSICAL THERAPY | Facility: HOSPITAL | Age: 60
Setting detail: THERAPIES SERIES
Discharge: HOME | End: 2025-01-17
Payer: COMMERCIAL

## 2025-01-17 DIAGNOSIS — R53.1 GENERALIZED WEAKNESS: ICD-10-CM

## 2025-01-17 DIAGNOSIS — M81.6 LOCALIZED OSTEOPOROSIS WITHOUT CURRENT PATHOLOGICAL FRACTURE: Primary | ICD-10-CM

## 2025-01-17 PROCEDURE — 97110 THERAPEUTIC EXERCISES: CPT

## 2025-01-17 NOTE — THERAPY TREATMENT NOTE
Outpatient Physical Therapy Ortho Treatment Note  [facility]     Patient Name: Autumn Geiger  : 1965  MRN: [id number]  Today's Date: 2025      Visit Date: 2025    Visit Dx:    ICD-10-CM ICD-9-CM   1. Localized osteoporosis without current pathological fracture  M81.6 733.09   2. Generalized weakness  R53.1 780.79       Patient Active Problem List   Diagnosis    White coat syndrome with high blood pressure but without hypertension    AR (allergic rhinitis)    Dyspepsia    Hyperlipidemia    Overweight (BMI 25.0-29.9)    Left upper quadrant abdominal pain    Encounter for screening for malignant neoplasm of colon        Past Medical History:   Diagnosis Date    Hyperlipidemia 3/27/2023    Hyperthyroidism 2017    During pregnancy, resolved    Pneumonia     several times        Past Surgical History:   Procedure Laterality Date    BREAST CYST ASPIRATION      x 2    BREAST SURGERY      COLONOSCOPY  2016    TONSILLECTOMY      WISDOM TOOTH EXTRACTION                          PT Assessment/Plan       Row Name 25 0800          PT Assessment    Assessment Comments Ms. Geiger returns to PT for first f/u to address her newly diagnosed osteoporosis with reports of falling following last session when she slipped on ice landing on her R hip. She denies any new injuries. Initiated circuit style training to address full body strengthening with good tolerance. Provided handouts of circuit 1 and 2 and reviewed in detail. Autumn Geiger remains a candidate for skilled PT.  -JS        PT Plan    PT Plan Comments response to last session, review circuits as needed, consider new circuit: shoulder HA, fwd lunge, shoulder row, monster walks  -JS               User Key  (r) = Recorded By, (t) = Taken By, (c) = Cosigned By      Initials Name Provider Type    Emilia Asher, PT Physical Therapist                       OP Exercises       Row Name 25 0700             Subjective    Subjective  Comments I had a fall on the ice the day of our eval. I am feeling fine  -JS         Subjective Pain    Able to rate subjective pain? yes  -JS      Pre-Treatment Pain Level 0  -JS      Post-Treatment Pain Level 0  -JS         Total Minutes    30735 - PT Therapeutic Exercise Minutes 40  -JS         Exercise 1    Exercise Name 1 nustep  -JS      Time 1 5 mins  -JS      Additional Comments UE/LE  -JS         Exercise 2    Exercise Name 2 step up + knee   -JS      Cueing 2 Verbal;Demo  -JS      Sets 2 2  -JS      Reps 2 10  -JS      Time 2 8in, 5# DB  -JS      Additional Comments circuit 1  -JS         Exercise 3    Exercise Name 3 shoulder ext in tandem stance  -JS      Cueing 3 Verbal;Demo  -JS      Sets 3 2  -JS      Reps 3 15e  -JS      Time 3 GTB  -JS      Additional Comments circuit 1  -JS         Exercise 4    Exercise Name 4 lateral stepping  -JS      Cueing 4 Verbal;Demo  -JS      Sets 4 2  -JS      Reps 4 3 laps  -JS      Time 4 RTB below knees  -JS      Additional Comments circuit 1  -JS         Exercise 5    Exercise Name 5 biceps curl + OH press  -JS      Cueing 5 Verbal;Demo  -JS      Sets 5 2  -JS      Reps 5 15  -JS      Time 5 5#  -JS      Additional Comments circuit 1  -JS         Exercise 6    Exercise Name 6 STS  -JS      Cueing 6 Verbal;Demo  -JS      Sets 6 2  -JS      Reps 6 12  -JS      Time 6 5#  -JS      Additional Comments circuit 2  -JS         Exercise 7    Exercise Name 7 AR press  -JS      Cueing 7 Verbal;Demo  -JS      Sets 7 2  -JS      Reps 7 10  -JS      Time 7 GTB  -JS      Additional Comments circuit 2  -JS         Exercise 8    Exercise Name 8 lateral step up and overs + OH press  -JS      Cueing 8 Verbal;Demo  -JS      Sets 8 2  -JS      Reps 8 20  -JS      Time 8 8in  -JS      Additional Comments circuit 2  -JS         Exercise 9    Exercise Name 9 tandem walking  -JS      Cueing 9 Verbal;Demo  -JS      Sets 9 2  -JS      Reps 9 3 laps  -JS      Time 9 fwd/bwd  -JS       Additional Comments circuit 2  -JS                User Key  (r) = Recorded By, (t) = Taken By, (c) = Cosigned By      Initials Name Provider Type    Emilia Asher, PT Physical Therapist                                  PT OP Goals       Row Name 01/17/25 0700          PT Short Term Goals    STG Date to Achieve 02/07/25  -JS     STG 1 Pt will be independent with initial HEP to improve strength and static/dynamic balance.  -JS     STG 1 Progress Ongoing  -JS     STG 2 Patient will demonstrates improved strength of B hip strength to 4/5 for improved functional strength.  -JS     STG 2 Progress Ongoing  -JS     STG 3 Pt performs 30 seconds sit to stand having complete at least 3 more repetitions that was performed upon initial evaluation for progression of ease with functional transfers, LE strength, and safety in the home.  -JS     STG 3 Progress Ongoing  -        Long Term Goals    LTG Date to Achieve 03/09/25  -JS     LTG 1 Pt will be independent with advance HEP to improve strength and static/dynamic balance.  -JS     LTG 1 Progress Ongoing  -JS     LTG 2 Patient will demonstrates improved strength of B hip strength to 4+/5 for improved functional strength.  -JS     LTG 2 Progress Ongoing  -JS     LTG 3 Pt performs 30 seconds sit to stand having complete at least 5 more repetitions that was performed upon initial evaluation for progression of ease with functional transfers, LE strength, and safety in the home  -JS     LTG 3 Progress Ongoing  -JS     LTG 4 Pt demonstrates ability to maintain SLS on stable surface without loss of balance for > 20 seconds for improvements in gait with greater ability to weight shift, longer stride.  -JS     LTG 4 Progress Ongoing  -               User Key  (r) = Recorded By, (t) = Taken By, (c) = Cosigned By      Initials Name Provider Type    Emilia Asher PT Physical Therapist                    Therapy Education  Education Details: updated HEP with circuit 1 and  2  Given: HEP, Symptoms/condition management, Pain management, Posture/body mechanics  Program: Reinforced, Progressed  How Provided: Verbal, Demonstration, Written  Provided to: Patient  Level of Understanding: Verbalized, Demonstrated              Time Calculation:   Start Time: 0705  Stop Time: 0745  Time Calculation (min): 40 min  Timed Charges  25595 - PT Therapeutic Exercise Minutes: 40  Total Minutes  Timed Charges Total Minutes: 40   Total Minutes: 40  Therapy Charges for Today       Code Description Service Date Service Provider Modifiers Qty    70106198553  PT THER PROC EA 15 MIN 1/17/2025 Emilia Rodriguez, PT GP 3                      Emilia Rodriguez, PT  1/17/2025

## 2025-01-24 ENCOUNTER — HOSPITAL ENCOUNTER (OUTPATIENT)
Dept: PHYSICAL THERAPY | Facility: HOSPITAL | Age: 60
Setting detail: THERAPIES SERIES
Discharge: HOME | End: 2025-01-24
Payer: COMMERCIAL

## 2025-01-24 DIAGNOSIS — M81.6 LOCALIZED OSTEOPOROSIS WITHOUT CURRENT PATHOLOGICAL FRACTURE: Primary | ICD-10-CM

## 2025-01-24 DIAGNOSIS — R53.1 GENERALIZED WEAKNESS: ICD-10-CM

## 2025-01-24 PROCEDURE — 97110 THERAPEUTIC EXERCISES: CPT

## 2025-01-24 NOTE — THERAPY TREATMENT NOTE
Outpatient Physical Therapy Ortho Treatment Note  [facility]     Patient Name: Autumn Geiger  : 1965  MRN: [id number]  Today's Date: 2025      Visit Date: 2025    Visit Dx:    ICD-10-CM ICD-9-CM   1. Localized osteoporosis without current pathological fracture  M81.6 733.09   2. Generalized weakness  R53.1 780.79       Patient Active Problem List   Diagnosis    White coat syndrome with high blood pressure but without hypertension    AR (allergic rhinitis)    Dyspepsia    Hyperlipidemia    Overweight (BMI 25.0-29.9)    Left upper quadrant abdominal pain    Encounter for screening for malignant neoplasm of colon        Past Medical History:   Diagnosis Date    Hyperlipidemia 3/27/2023    Hyperthyroidism 2017    During pregnancy, resolved    Pneumonia     several times        Past Surgical History:   Procedure Laterality Date    BREAST CYST ASPIRATION      x 2    BREAST SURGERY      COLONOSCOPY  2016    TONSILLECTOMY      WISDOM TOOTH EXTRACTION                          PT Assessment/Plan       Row Name 25 0800          PT Assessment    Assessment Comments Ms. Geiger continues to do well with circuit style training. She was able to tolerate three full circuits this date and did not need rest break throughout session. Updated HEP, reviewed changes with patient and provided handout. Autumn Geiger remains a candidate for skilled PT.  -JS        PT Plan    PT Plan Comments response to last session, add circuit 4, triceps press, standing hip abd, mini squats, diagonals  -JS               User Key  (r) = Recorded By, (t) = Taken By, (c) = Cosigned By      Initials Name Provider Type    Emilia Asher, PT Physical Therapist                       OP Exercises       Row Name 25 0700             Subjective    Subjective Comments I was a little sore but not bad at all  -JS         Subjective Pain    Able to rate subjective pain? yes  -JS      Pre-Treatment Pain Level 0  -JS       Post-Treatment Pain Level 0  -JS         Total Minutes    78728 - PT Therapeutic Exercise Minutes 42  -JS         Exercise 1    Exercise Name 1 nustep  -JS      Time 1 5 mins  -JS         Exercise 2    Exercise Name 2 step up + knee   -JS      Cueing 2 Verbal;Demo  -JS      Sets 2 2  -JS      Reps 2 10  -JS      Time 2 8in, 5# DB  -JS      Additional Comments circuit 1  -JS         Exercise 3    Exercise Name 3 shoulder ext in tandem stance  -JS      Cueing 3 Verbal;Demo  -JS      Sets 3 2  -JS      Reps 3 15e  -JS      Time 3 GTB  -JS      Additional Comments circuit 1  -JS         Exercise 4    Exercise Name 4 lateral stepping  -JS      Cueing 4 Verbal;Demo  -JS      Sets 4 2  -JS      Reps 4 3 laps  -JS      Time 4 RTB below knees  -JS      Additional Comments circuit 1  -JS         Exercise 5    Exercise Name 5 biceps curl + OH press  -JS      Cueing 5 Verbal;Demo  -JS      Sets 5 2  -JS      Reps 5 15  -JS      Time 5 5#  -JS      Additional Comments circuit 1  -JS         Exercise 6    Exercise Name 6 STS  -JS      Cueing 6 Verbal;Demo  -JS      Sets 6 2  -JS      Reps 6 12  -JS      Time 6 5#  -JS      Additional Comments circuit 2  -JS         Exercise 7    Exercise Name 7 AR press  -JS      Cueing 7 Verbal;Demo  -JS      Sets 7 2  -JS      Reps 7 10  -JS      Time 7 GTB  -JS      Additional Comments circuit 2  -JS         Exercise 8    Exercise Name 8 lateral step up and overs + OH press  -JS      Cueing 8 Verbal;Demo  -JS      Sets 8 2  -JS      Reps 8 20  -JS      Time 8 8in  -JS      Additional Comments circuit 2  -JS         Exercise 9    Exercise Name 9 tandem walking  -JS      Cueing 9 Verbal;Demo  -JS      Sets 9 2  -JS      Reps 9 3 laps  -JS      Time 9 fwd/bwd  -JS      Additional Comments circuit 2  -JS         Exercise 10    Exercise Name 10 standing HA  -JS      Cueing 10 Verbal;Demo  -JS      Sets 10 2  -JS      Reps 10 10  -JS      Time 10 GTB  -JS      Additional Comments circuit 3   -JS         Exercise 11    Exercise Name 11 fwd lunge  -JS      Cueing 11 Verbal;Demo  -JS      Sets 11 2  -JS      Reps 11 10e  -JS      Additional Comments circuit 3  -JS         Exercise 12    Exercise Name 12 shoulder row  -JS      Cueing 12 Verbal;Demo  -JS      Reps 12 2x15  -JS      Time 12 GTB  -JS      Additional Comments circuit 3  -JS         Exercise 13    Exercise Name 13 monster walks  -JS      Cueing 13 Verbal;Demo  -JS      Sets 13 2  -JS      Reps 13 3 laps  -JS      Time 13 RTB below knees  -JS      Additional Comments circuit 3  -JS                User Key  (r) = Recorded By, (t) = Taken By, (c) = Cosigned By      Initials Name Provider Type    Emilia Asher, PT Physical Therapist                                  PT OP Goals       Row Name 01/24/25 0700          PT Short Term Goals    STG Date to Achieve 02/07/25  -JS     STG 1 Pt will be independent with initial HEP to improve strength and static/dynamic balance.  -JS     STG 1 Progress Ongoing  -JS     STG 2 Patient will demonstrates improved strength of B hip strength to 4/5 for improved functional strength.  -JS     STG 2 Progress Ongoing  -JS     STG 3 Pt performs 30 seconds sit to stand having complete at least 3 more repetitions that was performed upon initial evaluation for progression of ease with functional transfers, LE strength, and safety in the home.  -JS     STG 3 Progress Ongoing  -JS        Long Term Goals    LTG Date to Achieve 03/09/25  -JS     LTG 1 Pt will be independent with advance HEP to improve strength and static/dynamic balance.  -JS     LTG 1 Progress Ongoing  -JS     LTG 2 Patient will demonstrates improved strength of B hip strength to 4+/5 for improved functional strength.  -JS     LTG 2 Progress Ongoing  -JS     LTG 3 Pt performs 30 seconds sit to stand having complete at least 5 more repetitions that was performed upon initial evaluation for progression of ease with functional transfers, LE strength, and  safety in the home  -JS     LTG 3 Progress Ongoing  -     LTG 4 Pt demonstrates ability to maintain SLS on stable surface without loss of balance for > 20 seconds for improvements in gait with greater ability to weight shift, longer stride.  -     LTG 4 Progress Ongoing  -               User Key  (r) = Recorded By, (t) = Taken By, (c) = Cosigned By      Initials Name Provider Type    Emilia Asher, PT Physical Therapist                    Therapy Education  Education Details: updated HEP third circuit  Given: HEP, Symptoms/condition management, Pain management, Posture/body mechanics  Program: Reinforced, Progressed  How Provided: Verbal, Demonstration, Written  Provided to: Patient  Level of Understanding: Verbalized, Demonstrated              Time Calculation:   Start Time: 0747  Stop Time: 0829  Time Calculation (min): 42 min  Timed Charges  91910 - PT Therapeutic Exercise Minutes: 42  Total Minutes  Timed Charges Total Minutes: 42   Total Minutes: 42  Therapy Charges for Today       Code Description Service Date Service Provider Modifiers Qty    86443029880 HC PT THER PROC EA 15 MIN 1/24/2025 Emilia Rodriguez, PT GP 3                      Emilia Rodriguez PT  1/24/2025

## 2025-01-31 ENCOUNTER — HOSPITAL ENCOUNTER (OUTPATIENT)
Dept: PHYSICAL THERAPY | Facility: HOSPITAL | Age: 60
Setting detail: THERAPIES SERIES
Discharge: HOME | End: 2025-01-31
Payer: COMMERCIAL

## 2025-01-31 DIAGNOSIS — M81.6 LOCALIZED OSTEOPOROSIS WITHOUT CURRENT PATHOLOGICAL FRACTURE: Primary | ICD-10-CM

## 2025-01-31 DIAGNOSIS — R53.1 GENERALIZED WEAKNESS: ICD-10-CM

## 2025-01-31 PROCEDURE — 97110 THERAPEUTIC EXERCISES: CPT

## 2025-01-31 NOTE — THERAPY TREATMENT NOTE
Outpatient Physical Therapy Ortho Treatment Note  [facility]     Patient Name: Autumn Geiger  : 1965  MRN: [id number]  Today's Date: 2025      Visit Date: 2025    Visit Dx:    ICD-10-CM ICD-9-CM   1. Localized osteoporosis without current pathological fracture  M81.6 733.09   2. Generalized weakness  R53.1 780.79       Patient Active Problem List   Diagnosis    White coat syndrome with high blood pressure but without hypertension    AR (allergic rhinitis)    Dyspepsia    Hyperlipidemia    Overweight (BMI 25.0-29.9)    Left upper quadrant abdominal pain    Encounter for screening for malignant neoplasm of colon        Past Medical History:   Diagnosis Date    Hyperlipidemia 3/27/2023    Hyperthyroidism 2017    During pregnancy, resolved    Pneumonia     several times        Past Surgical History:   Procedure Laterality Date    BREAST CYST ASPIRATION      x 2    BREAST SURGERY      COLONOSCOPY  2016    TONSILLECTOMY      WISDOM TOOTH EXTRACTION                          PT Assessment/Plan       Row Name 25 0800          PT Assessment    Assessment Comments Pt continues to have good tolerance to circuits and benefits from addition of fourth and final circuit. She requires intermittent cues to improve scapular retraction with UE exercises. Updated HEP with fourth circuit and reviewed with patient. Will plan to transition to independent management in 1-2 visits. Autumn Geiger remains a candidate for skilled PT.  -JS        PT Plan    PT Plan Comments review 3-4 circuit, put chair behind patient for mini squats  -JS               User Key  (r) = Recorded By, (t) = Taken By, (c) = Cosigned By      Initials Name Provider Type    Emilia Asher, PT Physical Therapist                       OP Exercises       Row Name 25 0700             Subjective    Subjective Comments Things are going good with the circuits  -JS         Subjective Pain    Able to rate subjective pain? yes   -JS      Pre-Treatment Pain Level 0  -JS      Post-Treatment Pain Level 0  -JS         Total Minutes    50352 - PT Therapeutic Exercise Minutes 41  -JS         Exercise 1    Exercise Name 1 nustep  -JS      Time 1 5 mins  -JS         Exercise 2    Exercise Name 2 step up + knee   -JS      Cueing 2 Verbal;Demo  -JS      Sets 2 2  -JS      Reps 2 10  -JS      Time 2 8in, 5# DB  -JS      Additional Comments circuit 1  -JS         Exercise 3    Exercise Name 3 shoulder ext in tandem stance  -JS      Cueing 3 Verbal;Demo  -JS      Sets 3 2  -JS      Reps 3 15e  -JS      Time 3 GTB  -JS      Additional Comments circuit 1  -JS         Exercise 4    Exercise Name 4 lateral stepping  -JS      Cueing 4 Verbal;Demo  -JS      Sets 4 2  -JS      Reps 4 3 laps  -JS      Time 4 RTB below knees  -JS      Additional Comments circuit 1  -JS         Exercise 5    Exercise Name 5 biceps curl + OH press  -JS      Cueing 5 Verbal;Demo  -JS      Sets 5 2  -JS      Reps 5 15  -JS      Time 5 5#  -JS      Additional Comments circuit 1  -JS         Exercise 10    Exercise Name 10 standing HA  -JS      Cueing 10 Verbal;Demo  -JS      Sets 10 2  -JS      Reps 10 10  -JS      Time 10 GTB  -JS      Additional Comments circuit 3  -JS         Exercise 11    Exercise Name 11 fwd lunge  -JS      Cueing 11 Verbal;Demo  -JS      Sets 11 2  -JS      Reps 11 10e  -JS      Additional Comments circuit 3  -JS         Exercise 12    Exercise Name 12 shoulder row  -JS      Cueing 12 Verbal;Demo  -JS      Reps 12 2x15  -JS      Time 12 GTB  -JS      Additional Comments circuit 3  -JS         Exercise 13    Exercise Name 13 monster walks  -JS      Cueing 13 Verbal;Demo  -JS      Sets 13 2  -JS      Reps 13 3 laps  -JS      Time 13 RTB below knees  -JS      Additional Comments circuit 3  -JS         Exercise 14    Exercise Name 14 triceps press down  -JS      Cueing 14 Verbal;Demo  -JS      Sets 14 2  -JS      Reps 14 15  -JS      Time 14 RTB  -JS       Additional Comments circuit 4  -JS         Exercise 15    Exercise Name 15 standing hip abd  -JS      Cueing 15 Verbal;Demo  -JS      Sets 15 2  -JS      Reps 15 15e  -JS      Time 15 RTB, band at amkles  -JS      Additional Comments circuit 4  -JS         Exercise 16    Exercise Name 16 B shoulder diagonals  -JS      Cueing 16 Verbal;Demo  -JS      Sets 16 2  -JS      Reps 16 10e  -JS      Time 16 GTB  -JS      Additional Comments circuit 4  -JS         Exercise 17    Exercise Name 17 mini squats  -JS      Cueing 17 Verbal;Demo  -JS      Sets 17 2  -JS      Reps 17 15  -JS      Time 17 band above knees, cues for form  -JS      Additional Comments circuit 4  -JS                User Key  (r) = Recorded By, (t) = Taken By, (c) = Cosigned By      Initials Name Provider Type    Emilia Asher, PT Physical Therapist                                  PT OP Goals       Row Name 01/31/25 0700          PT Short Term Goals    STG Date to Achieve 02/07/25  -JS     STG 1 Pt will be independent with initial HEP to improve strength and static/dynamic balance.  -JS     STG 1 Progress Ongoing  -JS     STG 2 Patient will demonstrates improved strength of B hip strength to 4/5 for improved functional strength.  -JS     STG 2 Progress Ongoing  -JS     STG 3 Pt performs 30 seconds sit to stand having complete at least 3 more repetitions that was performed upon initial evaluation for progression of ease with functional transfers, LE strength, and safety in the home.  -JS     STG 3 Progress Ongoing  -JS        Long Term Goals    LTG Date to Achieve 03/09/25  -JS     LTG 1 Pt will be independent with advance HEP to improve strength and static/dynamic balance.  -JS     LTG 1 Progress Ongoing  -JS     LTG 2 Patient will demonstrates improved strength of B hip strength to 4+/5 for improved functional strength.  -JS     LTG 2 Progress Ongoing  -JS     LTG 3 Pt performs 30 seconds sit to stand having complete at least 5 more repetitions  that was performed upon initial evaluation for progression of ease with functional transfers, LE strength, and safety in the home  -VIANEY     LTG 3 Progress Ongoing  -     LTG 4 Pt demonstrates ability to maintain SLS on stable surface without loss of balance for > 20 seconds for improvements in gait with greater ability to weight shift, longer stride.  -     LTG 4 Progress Ongoing  -               User Key  (r) = Recorded By, (t) = Taken By, (c) = Cosigned By      Initials Name Provider Type    Emilia Asher, PT Physical Therapist                    Therapy Education  Education Details: updated HEP fourth circuit  Given: HEP, Symptoms/condition management, Pain management, Posture/body mechanics  Program: Reinforced, Progressed  How Provided: Verbal, Demonstration, Written  Provided to: Patient  Level of Understanding: Verbalized, Demonstrated              Time Calculation:   Start Time: 0704  Stop Time: 0745  Time Calculation (min): 41 min  Timed Charges  20947 - PT Therapeutic Exercise Minutes: 41  Total Minutes  Timed Charges Total Minutes: 41   Total Minutes: 41  Therapy Charges for Today       Code Description Service Date Service Provider Modifiers Qty    58047945683 HC PT THER PROC EA 15 MIN 1/31/2025 Emilia Rodriguez, PT GP 3                      Emilia Rodriguez PT  1/31/2025

## 2025-02-07 ENCOUNTER — HOSPITAL ENCOUNTER (OUTPATIENT)
Dept: PHYSICAL THERAPY | Facility: HOSPITAL | Age: 60
Setting detail: THERAPIES SERIES
Discharge: HOME | End: 2025-02-07
Payer: COMMERCIAL

## 2025-02-07 DIAGNOSIS — M81.6 LOCALIZED OSTEOPOROSIS WITHOUT CURRENT PATHOLOGICAL FRACTURE: Primary | ICD-10-CM

## 2025-02-07 DIAGNOSIS — R53.1 GENERALIZED WEAKNESS: ICD-10-CM

## 2025-02-07 PROCEDURE — 97110 THERAPEUTIC EXERCISES: CPT

## 2025-02-07 NOTE — THERAPY TREATMENT NOTE
Outpatient Physical Therapy Ortho Treatment Note  [facility]     Patient Name: Autumn Geiger  : 1965  MRN: [id number]  Today's Date: 2025      Visit Date: 2025    Visit Dx:    ICD-10-CM ICD-9-CM   1. Localized osteoporosis without current pathological fracture  M81.6 733.09   2. Generalized weakness  R53.1 780.79       Patient Active Problem List   Diagnosis    White coat syndrome with high blood pressure but without hypertension    AR (allergic rhinitis)    Dyspepsia    Hyperlipidemia    Overweight (BMI 25.0-29.9)    Left upper quadrant abdominal pain    Encounter for screening for malignant neoplasm of colon        Past Medical History:   Diagnosis Date    Hyperlipidemia 3/27/2023    Hyperthyroidism 2017    During pregnancy, resolved    Pneumonia     several times        Past Surgical History:   Procedure Laterality Date    BREAST CYST ASPIRATION      x 2    BREAST SURGERY      COLONOSCOPY  2016    TONSILLECTOMY      WISDOM TOOTH EXTRACTION                          PT Assessment/Plan       Row Name 25 0800          PT Assessment    Assessment Comments Ms. Geiger continues to demo good tolerance with circuit training. Reviewed circuits 3 and 4, as these are the two newest circuits. Will plan to transition to independent management at next session.  -JS        PT Plan    PT Plan Comments anticipate d/c  -JS               User Key  (r) = Recorded By, (t) = Taken By, (c) = Cosigned By      Initials Name Provider Type    Emilia Asher, PT Physical Therapist                       OP Exercises       Row Name 25 0700             Subjective    Subjective Comments circuits are going good at home  -JS         Subjective Pain    Able to rate subjective pain? yes  -JS      Pre-Treatment Pain Level 0  -JS      Post-Treatment Pain Level 0  -JS         Total Minutes    78478 - PT Therapeutic Exercise Minutes 39  -JS         Exercise 1    Exercise Name 1 nustep  -JS      Time 1 5  mins  -JS         Exercise 6    Exercise Name 6 STS  -JS      Cueing 6 Verbal;Demo  -JS      Sets 6 2  -JS      Reps 6 12  -JS      Time 6 5#  -JS      Additional Comments circuit 2  -JS         Exercise 7    Exercise Name 7 AR press  -JS      Cueing 7 Verbal;Demo  -JS      Sets 7 2  -JS      Reps 7 10  -JS      Time 7 GTB  -JS      Additional Comments circuit 2  -JS         Exercise 8    Exercise Name 8 lateral step up and overs + OH press  -JS      Cueing 8 Verbal;Demo  -JS      Sets 8 2  -JS      Reps 8 20  -JS      Time 8 8in, 5#  -JS      Additional Comments circuit 2  -JS         Exercise 9    Exercise Name 9 tandem walking  -JS      Cueing 9 Verbal;Demo  -JS      Sets 9 2  -JS      Reps 9 3 laps  -JS      Time 9 fwd/bwd  -JS      Additional Comments circuit 2  -JS         Exercise 10    Exercise Name 10 standing HA  -JS      Cueing 10 Verbal;Demo  -JS      Sets 10 2  -JS      Reps 10 12  -JS      Time 10 GTB  -JS      Additional Comments circuit 3  -JS         Exercise 11    Exercise Name 11 fwd lunge  -JS      Cueing 11 Verbal;Demo  -JS      Sets 11 2  -JS      Reps 11 10e  -JS      Time 11 5#  -JS      Additional Comments circuit 3  -JS         Exercise 12    Exercise Name 12 shoulder row  -JS      Cueing 12 Verbal;Demo  -JS      Reps 12 2x15  -JS      Time 12 GTB  -JS      Additional Comments circuit 3  -JS         Exercise 13    Exercise Name 13 monster walks  -JS      Cueing 13 Verbal;Demo  -JS      Sets 13 2  -JS      Reps 13 3 laps  -JS      Time 13 RTB below knees  -JS      Additional Comments circuit 3  -JS         Exercise 14    Exercise Name 14 triceps press down  -JS      Cueing 14 Verbal;Demo  -JS      Sets 14 2  -JS      Reps 14 15  -JS      Time 14 RTB  -JS      Additional Comments circuit 4  -JS         Exercise 15    Exercise Name 15 standing hip abd  -JS      Cueing 15 Verbal;Demo  -JS      Sets 15 2  -JS      Reps 15 15e  -JS      Time 15 RTB, band at ankles  -JS      Additional Comments  circuit 4  -JS         Exercise 16    Exercise Name 16 B shoulder diagonals  -JS      Cueing 16 Verbal;Demo  -JS      Sets 16 2  -JS      Reps 16 10e  -JS      Time 16 GTB  -JS      Additional Comments circuit 4  -JS         Exercise 17    Exercise Name 17 mini squats  -JS      Cueing 17 Verbal;Demo  -JS      Sets 17 2  -JS      Reps 17 15  -JS      Time 17 band above knees, cues for form  -JS      Additional Comments circuit 4  -JS                User Key  (r) = Recorded By, (t) = Taken By, (c) = Cosigned By      Initials Name Provider Type    Emilia Asher, PT Physical Therapist                                  PT OP Goals       Row Name 02/07/25 0700          PT Short Term Goals    STG Date to Achieve 02/07/25  -JS     STG 1 Pt will be independent with initial HEP to improve strength and static/dynamic balance.  -JS     STG 1 Progress Ongoing  -JS     STG 2 Patient will demonstrates improved strength of B hip strength to 4/5 for improved functional strength.  -JS     STG 2 Progress Ongoing  -JS     STG 3 Pt performs 30 seconds sit to stand having complete at least 3 more repetitions that was performed upon initial evaluation for progression of ease with functional transfers, LE strength, and safety in the home.  -JS     STG 3 Progress Ongoing  -JS        Long Term Goals    LTG Date to Achieve 03/09/25  -JS     LTG 1 Pt will be independent with advance HEP to improve strength and static/dynamic balance.  -JS     LTG 1 Progress Ongoing  -JS     LTG 2 Patient will demonstrates improved strength of B hip strength to 4+/5 for improved functional strength.  -JS     LTG 2 Progress Ongoing  -JS     LTG 3 Pt performs 30 seconds sit to stand having complete at least 5 more repetitions that was performed upon initial evaluation for progression of ease with functional transfers, LE strength, and safety in the home  -JS     LTG 3 Progress Ongoing  -JS     LTG 4 Pt demonstrates ability to maintain SLS on stable surface  without loss of balance for > 20 seconds for improvements in gait with greater ability to weight shift, longer stride.  -VIANEY     LTG 4 Progress Ongoing  -VIANEY               User Key  (r) = Recorded By, (t) = Taken By, (c) = Cosigned By      Initials Name Provider Type    Emilia Asher, PT Physical Therapist                                   Time Calculation:   Start Time: 0705  Stop Time: 0744  Time Calculation (min): 39 min  Timed Charges  63033 - PT Therapeutic Exercise Minutes: 39  Total Minutes  Timed Charges Total Minutes: 39   Total Minutes: 39  Therapy Charges for Today       Code Description Service Date Service Provider Modifiers Qty    [id number]  PT THER PROC EA 15 MIN 2/7/2025 Emilia Rodriguez, PT GP 3                      Emilia Rodriguez PT  2/7/2025

## 2025-02-14 ENCOUNTER — APPOINTMENT (OUTPATIENT)
Dept: PHYSICAL THERAPY | Facility: HOSPITAL | Age: 60
End: 2025-02-14
Payer: COMMERCIAL

## 2025-03-17 ENCOUNTER — HOSPITAL ENCOUNTER (OUTPATIENT)
Facility: SURGERY CENTER | Age: 60
Setting detail: HOSPITAL OUTPATIENT SURGERY
Discharge: HOME | End: 2025-03-17
Attending: INTERNAL MEDICINE | Admitting: INTERNAL MEDICINE
Payer: COMMERCIAL

## 2025-03-17 ENCOUNTER — ANESTHESIA EVENT (OUTPATIENT)
Dept: SURGERY | Facility: SURGERY CENTER | Age: 60
End: 2025-03-17
Payer: COMMERCIAL

## 2025-03-17 ENCOUNTER — ANESTHESIA (OUTPATIENT)
Dept: SURGERY | Facility: SURGERY CENTER | Age: 60
End: 2025-03-17
Payer: COMMERCIAL

## 2025-03-17 VITALS
HEART RATE: 69 BPM | SYSTOLIC BLOOD PRESSURE: 112 MMHG | BODY MASS INDEX: 27.08 KG/M2 | OXYGEN SATURATION: 93 % | WEIGHT: 158.6 LBS | DIASTOLIC BLOOD PRESSURE: 72 MMHG | HEIGHT: 64 IN | RESPIRATION RATE: 16 BRPM | TEMPERATURE: 98.2 F

## 2025-03-17 DIAGNOSIS — Z12.11 ENCOUNTER FOR SCREENING FOR MALIGNANT NEOPLASM OF COLON: ICD-10-CM

## 2025-03-17 DIAGNOSIS — R10.12 LEFT UPPER QUADRANT ABDOMINAL PAIN: ICD-10-CM

## 2025-03-17 PROCEDURE — 45385 COLONOSCOPY W/LESION REMOVAL: CPT | Performed by: INTERNAL MEDICINE

## 2025-03-17 PROCEDURE — 25010000002 LIDOCAINE 1 % SOLUTION

## 2025-03-17 PROCEDURE — 25810000003 LACTATED RINGERS PER 1000 ML: Performed by: NURSE PRACTITIONER

## 2025-03-17 PROCEDURE — 25010000002 PROPOFOL 1000 MG/100ML EMULSION

## 2025-03-17 PROCEDURE — 25010000002 PROPOFOL 10 MG/ML EMULSION

## 2025-03-17 PROCEDURE — 43239 EGD BIOPSY SINGLE/MULTIPLE: CPT | Performed by: INTERNAL MEDICINE

## 2025-03-17 PROCEDURE — 88305 TISSUE EXAM BY PATHOLOGIST: CPT | Performed by: INTERNAL MEDICINE

## 2025-03-17 PROCEDURE — 88313 SPECIAL STAINS GROUP 2: CPT | Performed by: INTERNAL MEDICINE

## 2025-03-17 RX ORDER — SODIUM CHLORIDE 0.9 % (FLUSH) 0.9 %
3 SYRINGE (ML) INJECTION EVERY 12 HOURS SCHEDULED
Status: DISCONTINUED | OUTPATIENT
Start: 2025-03-17 | End: 2025-03-17 | Stop reason: HOSPADM

## 2025-03-17 RX ORDER — LIDOCAINE HYDROCHLORIDE 10 MG/ML
INJECTION, SOLUTION INFILTRATION; PERINEURAL AS NEEDED
Status: DISCONTINUED | OUTPATIENT
Start: 2025-03-17 | End: 2025-03-17 | Stop reason: SURG

## 2025-03-17 RX ORDER — PROPOFOL 10 MG/ML
INJECTION, EMULSION INTRAVENOUS AS NEEDED
Status: DISCONTINUED | OUTPATIENT
Start: 2025-03-17 | End: 2025-03-17 | Stop reason: SURG

## 2025-03-17 RX ORDER — SODIUM CHLORIDE 0.9 % (FLUSH) 0.9 %
10 SYRINGE (ML) INJECTION AS NEEDED
Status: DISCONTINUED | OUTPATIENT
Start: 2025-03-17 | End: 2025-03-17 | Stop reason: HOSPADM

## 2025-03-17 RX ORDER — SODIUM CHLORIDE, SODIUM LACTATE, POTASSIUM CHLORIDE, CALCIUM CHLORIDE 600; 310; 30; 20 MG/100ML; MG/100ML; MG/100ML; MG/100ML
30 INJECTION, SOLUTION INTRAVENOUS CONTINUOUS PRN
Status: ACTIVE | OUTPATIENT
Start: 2025-03-17 | End: 2025-03-17

## 2025-03-17 RX ADMIN — PROPOFOL 50 MG: 10 INJECTION, EMULSION INTRAVENOUS at 08:05

## 2025-03-17 RX ADMIN — LIDOCAINE HYDROCHLORIDE 50 MG: 10 INJECTION, SOLUTION INFILTRATION; PERINEURAL at 07:58

## 2025-03-17 RX ADMIN — SODIUM CHLORIDE, POTASSIUM CHLORIDE, SODIUM LACTATE AND CALCIUM CHLORIDE 30 ML/HR: 600; 310; 30; 20 INJECTION, SOLUTION INTRAVENOUS at 07:08

## 2025-03-17 RX ADMIN — PROPOFOL 100 MG: 10 INJECTION, EMULSION INTRAVENOUS at 07:58

## 2025-03-17 RX ADMIN — PROPOFOL 180 MCG/KG/MIN: 10 INJECTION, EMULSION INTRAVENOUS at 08:05

## 2025-03-17 RX ADMIN — PROPOFOL 50 MG: 10 INJECTION, EMULSION INTRAVENOUS at 08:02

## 2025-03-17 NOTE — H&P
No chief complaint on file.      HPI  Patient here today for an EGD due to dyspepsia and left upper quadrant pain and a colonoscopy for screening.         Problem List:    Patient Active Problem List   Diagnosis    White coat syndrome with high blood pressure but without hypertension    AR (allergic rhinitis)    Dyspepsia    Hyperlipidemia    Overweight (BMI 25.0-29.9)    Left upper quadrant abdominal pain    Encounter for screening for malignant neoplasm of colon       Medical History:    Past Medical History:   Diagnosis Date    Hyperlipidemia 3/27/2023    Hyperthyroidism 08/29/2017    During pregnancy, resolved    Pneumonia     several times        Social History:    Social History     Socioeconomic History    Marital status:      Spouse name: Kamran*    Number of children: 2   Tobacco Use    Smoking status: Never    Smokeless tobacco: Never   Vaping Use    Vaping status: Never Used   Substance and Sexual Activity    Alcohol use: No    Drug use: No    Sexual activity: Yes     Partners: Male       Family History:   Family History   Problem Relation Age of Onset    Heart disease Mother     Hyperlipidemia Mother     Hypertension Mother     Arthritis Mother     Diabetes Mother     Cancer Mother         small bowel cancer    Cirrhosis Mother     Hyperlipidemia Father     Hypertension Father     Diverticulitis Father     No Known Problems Sister         fraternal twin    Clotting disorder Brother         PE    Hypothyroidism Brother     Melanoma Brother     Obesity Brother     No Known Problems Daughter     Down syndrome Son     Breast cancer Neg Hx     Ovarian cancer Neg Hx     Uterine cancer Neg Hx     Colon cancer Neg Hx     Colon polyps Neg Hx     Crohn's disease Neg Hx     Irritable bowel syndrome Neg Hx     Ulcerative colitis Neg Hx        Surgical History:   Past Surgical History:   Procedure Laterality Date    BREAST CYST ASPIRATION      x 2    BREAST SURGERY      COLONOSCOPY  2016    TONSILLECTOMY       WISDOM TOOTH EXTRACTION           Current Facility-Administered Medications:     lactated ringers infusion, 30 mL/hr, Intravenous, Continuous PRN, Jarad, Deyanira G, APRN    sodium chloride 0.9 % flush 10 mL, 10 mL, Intravenous, PRN, Jarad, Deyanira G, APRN    sodium chloride 0.9 % flush 3 mL, 3 mL, Intravenous, Q12H, Jarad, Deyanira G, APRN    Allergies:   Allergies   Allergen Reactions    Erythromycin GI Intolerance     GI upset.        The following portions of the patient's history were reviewed by me and updated as appropriate: review of systems, allergies, current medications, past family history, past medical history, past social history, past surgical history and problem list.    Vitals:    03/17/25 0704   BP: 147/93   Pulse: 74   Resp: 16   Temp: 97.3 °F (36.3 °C)   SpO2: 97%       PHYSICAL EXAM:    CONSTITUTIONAL:  today's vital signs reviewed by me  GASTROINTESTINAL: abdomen is soft nontender nondistended with normal active bowel sounds, no masses are appreciated    Assessment/ Plan  We will proceed today with EGD and colonoscopy.    Risks and benefits as well as alternatives to endoscopic evaluation were explained to the patient and they voiced understanding and wish to proceed.  These risks include but are not limited to the risk of bleeding, perforation, adverse reaction to sedation, and missed lesions.  The patient was given the opportunity to ask questions prior to the endoscopic procedure.

## 2025-03-17 NOTE — ANESTHESIA PREPROCEDURE EVALUATION
Anesthesia Evaluation     Patient summary reviewed and Nursing notes reviewed   NPO Solid Status: > 8 hours  NPO Liquid Status: > 2 hours           Airway   Mallampati: II  TM distance: >3 FB  Neck ROM: full  No difficulty expected  Dental - normal exam     Pulmonary - normal exam    breath sounds clear to auscultation  Cardiovascular - normal exam    Rhythm: regular  Rate: normal    (+) hypertension less than 2 medications, hyperlipidemia    ROS comment: White-coat syndrome    Neuro/Psych  GI/Hepatic/Renal/Endo    (+) thyroid problem hyperthyroidism    Musculoskeletal     Abdominal  - normal exam   Substance History      OB/GYN          Other                      Anesthesia Plan    ASA 2     MAC     intravenous induction     Anesthetic plan, risks, benefits, and alternatives have been provided, discussed and informed consent has been obtained with: patient.      CODE STATUS:

## 2025-03-17 NOTE — ANESTHESIA POSTPROCEDURE EVALUATION
Patient: Autumn Geiger    Procedure Summary       Date: 03/17/25 Room / Location: [facility]    Anesthesia Start: 0754 Anesthesia Stop: 0826    Procedures:       ESOPHAGOGASTRODUODENOSCOPY      COLONOSCOPY FOR SCREENING Diagnosis:       Left upper quadrant abdominal pain      Encounter for screening for malignant neoplasm of colon      (Left upper quadrant abdominal pain [R10.12])      (Encounter for screening for malignant neoplasm of colon [Z12.11])    Surgeons: Rafael Kumari MD Provider: Kvng Kaplan MD    Anesthesia Type: MAC ASA Status: 2            Anesthesia Type: MAC    Vitals  Vitals Value Taken Time   /72 03/17/25 08:48   Temp 36.8 °C (98.2 °F) 03/17/25 08:25   Pulse 73 03/17/25 08:49   Resp 16 03/17/25 08:45   SpO2 93 % 03/17/25 08:45   Vitals shown include unfiled device data.        Post Anesthesia Care and Evaluation    Patient location during evaluation: PHASE II  Patient participation: complete - patient participated  Level of consciousness: awake and alert  Pain management: adequate    Airway patency: patent  Anesthetic complications: No anesthetic complications  PONV Status: none  Cardiovascular status: acceptable and hemodynamically stable  Respiratory status: acceptable, nonlabored ventilation and spontaneous ventilation  Hydration status: acceptable

## 2025-03-19 LAB
CYTO UR: NORMAL
DX PRELIMINARY: NORMAL
LAB AP CASE REPORT: NORMAL
PATH REPORT.FINAL DX SPEC: NORMAL
PATH REPORT.GROSS SPEC: NORMAL

## 2025-03-28 ENCOUNTER — OFFICE VISIT (OUTPATIENT)
Dept: INTERNAL MEDICINE | Age: 60
End: 2025-03-28
Payer: COMMERCIAL

## 2025-03-28 VITALS
DIASTOLIC BLOOD PRESSURE: 84 MMHG | BODY MASS INDEX: 27.42 KG/M2 | TEMPERATURE: 97.5 F | HEIGHT: 64 IN | OXYGEN SATURATION: 97 % | SYSTOLIC BLOOD PRESSURE: 126 MMHG | WEIGHT: 160.6 LBS | HEART RATE: 69 BPM

## 2025-03-28 DIAGNOSIS — R03.0 WHITE COAT SYNDROME WITH HIGH BLOOD PRESSURE BUT WITHOUT HYPERTENSION: Chronic | ICD-10-CM

## 2025-03-28 DIAGNOSIS — Z00.00 ENCOUNTER FOR ANNUAL HEALTH EXAMINATION: ICD-10-CM

## 2025-03-28 DIAGNOSIS — K21.00 GASTROESOPHAGEAL REFLUX DISEASE WITH ESOPHAGITIS WITHOUT HEMORRHAGE: Primary | Chronic | ICD-10-CM

## 2025-03-28 DIAGNOSIS — M81.0 AGE-RELATED OSTEOPOROSIS WITHOUT CURRENT PATHOLOGICAL FRACTURE: ICD-10-CM

## 2025-03-28 PROBLEM — R10.12 LEFT UPPER QUADRANT ABDOMINAL PAIN: Status: RESOLVED | Noted: 2024-12-31 | Resolved: 2025-03-28

## 2025-03-28 PROBLEM — Z12.11 ENCOUNTER FOR SCREENING FOR MALIGNANT NEOPLASM OF COLON: Status: RESOLVED | Noted: 2024-12-31 | Resolved: 2025-03-28

## 2025-03-28 RX ORDER — PANTOPRAZOLE SODIUM 40 MG/1
40 TABLET, DELAYED RELEASE ORAL DAILY
COMMUNITY

## 2025-03-28 NOTE — ASSESSMENT & PLAN NOTE
BP Readings from Last 3 Encounters:   03/28/25 126/84   03/17/25 112/72   02/21/25 159/99      Blood pressure is better here and home.   Continue to monitor.   Released [employer] BP chart again.

## 2025-03-28 NOTE — ASSESSMENT & PLAN NOTE
EGD: LA B esophagitis     Just restarted pantoprazole 40 mg daily   Considering surgery   Dietary / lifestyle changes discussed.

## 2025-03-28 NOTE — PROGRESS NOTES
J  U  N  O  H    K  I  M ,   M  D       I  N  T  E  R  N  A  L    M  E  D  I  C  I  N  E         ENCOUNTER DATE:  03/28/2025    Autumn Geiger / 59 y.o. / female    OFFICE VISIT ENCOUNTER       CHIEF COMPLAINT / REASON FOR OFFICE VISIT     Hypertension and Heartburn      ASSESSMENT & PLAN     Problem List Items Addressed This Visit          High    Gastroesophageal reflux disease with esophagitis without hemorrhage - Primary (Chronic)    Current Assessment & Plan   EGD: LA B esophagitis     Just restarted pantoprazole 40 mg daily   Considering surgery   Dietary / lifestyle changes discussed.          Relevant Medications    pantoprazole (PROTONIX) 40 MG EC tablet       Medium    White coat syndrome with high blood pressure but without hypertension (Chronic)    Current Assessment & Plan   BP Readings from Last 3 Encounters:   03/28/25 126/84   03/17/25 112/72   02/21/25 159/99      Blood pressure is better here and home.   Continue to monitor.   Released [facility] BP chart again.          Relevant Orders    [facility] BP Flowsheet    Age-related osteoporosis without current pathological fracture (Chronic)    Overview   Managed by OB/gyne         Current Assessment & Plan   Recent DEXA showed osteoporosis, deferred medication.  Avoid bisphosphonate therapy. Recommended Prolia if initiating therapy.           Other Visit Diagnoses         Encounter for annual health examination        Relevant Orders    Comprehensive Metabolic Panel    Lipid Panel With / Chol / HDL Ratio    CBC & Differential    Hemoglobin A1c    TSH+Free T4    Urinalysis With Microscopic If Indicated (No Culture) - Urine, Clean Catch          Orders Placed This Encounter   Procedures    [facility] BP Flowsheet     After how many days would you like to receive a notification of this patient's flowsheet entries?:   30    Comprehensive Metabolic Panel     Standing Status:   Future     Expected Date:   8/28/2025     Expiration Date:   5/2/2026      "Release to patient:   Routine Release [[id number]]    Lipid Panel With / Chol / HDL Ratio     Standing Status:   Future     Expected Date:   8/28/2025     Expiration Date:   5/2/2026     Release to patient:   Routine Release [[id number]]    Hemoglobin A1c     Standing Status:   Future     Expected Date:   8/28/2025     Expiration Date:   5/2/2026     Release to patient:   Routine Release [[id number]]    TSH+Free T4     Standing Status:   Future     Expected Date:   8/28/2025     Expiration Date:   5/2/2026     Release to patient:   Routine Release [[id number]]    Urinalysis With Microscopic If Indicated (No Culture) - Urine, Clean Catch     Standing Status:   Future     Expected Date:   8/28/2025     Expiration Date:   5/2/2026     Release to patient:   Routine Release [[id number]]    CBC & Differential     Standing Status:   Future     Expected Date:   8/28/2025     Expiration Date:   5/2/2026     Manual Differential:   No     Release to patient:   Routine Release [[id number]]     No orders of the defined types were placed in this encounter.      SUMMARY/DISCUSSION        TOTAL TIME OF ENCOUNTER:        Next Appointment with me: Visit date not found     Return in about 6 months (around 9/28/2025) for SCHEDULE COMBINED ANNUAL PHYSICAL & MEDICAL FU.      VITAL SIGNS     Vitals:    03/28/25 0754   BP: 126/84   Pulse: 69   Temp: 97.5 °F (36.4 °C)   SpO2: 97%   Weight: 72.8 kg (160 lb 9.6 oz)   Height: 162.6 cm (64\")       BP Readings from Last 3 Encounters:   03/28/25 126/84   03/17/25 112/72   02/21/25 159/99     Wt Readings from Last 3 Encounters:   03/28/25 72.8 kg (160 lb 9.6 oz)   03/17/25 71.9 kg (158 lb 9.6 oz)   02/21/25 72.6 kg (160 lb)     Body mass index is 27.57 kg/m².    Blood pressure readings recorded on patient flowsheet:  [employer] Blood Pressure Flowsheet Systolic Diastolic Pulse   1/11/2025   8:00   81  67    12/4/2024   8:27   81  75    11/10/2024   9:09   81  85    10/18/2024   6:42 "   81  72    10/18/2024   6:41 AM   71    9/24/2024  10:30   80  59    9/2/2024   7:22   81  80    8/12/2024   7:50   82  74    7/15/2024   6:52   79  74    6/3/2024   9:02   79  68        Patient-reported         MEDICATIONS AT THE TIME OF OFFICE VISIT     Current Outpatient Medications on File Prior to Visit   Medication Sig    Calcium-Vitamin D (CALTRATE 600 PLUS-VIT D PO) Take  by mouth.    multivitamin with minerals tablet tablet Take 1 tablet by mouth Daily.    pantoprazole (PROTONIX) 40 MG EC tablet Take 1 tablet by mouth Daily.     No current facility-administered medications on file prior to visit.          HISTORY OF PRESENT ILLNESS     Previously developed hives shortly after starting pantoprazole back in October.  She was experiencing ongoing reflux and heartburn symptoms and underwent EGD which revealed 2 cm hiatal hernia with LA grade B esophagitis.  She is just resumed taking pantoprazole 40 mg daily and denies any skin eruptions.  She has made some dietary and lifestyle modifications as well.  She is interested in considering surgery so she does not have to take a PPI for the rest of her life.  Most recent DEXA scan with her OB/GYN showed evidence of osteoporosis.  She deferred medication at the time.    Patient Care Team:  Sanchez Perez MD as PCP - General (Internal Medicine)  Joey Sorenson MD as Consulting Physician (Obstetrics and Gynecology)  Deyanira Abraham APRN as Nurse Practitioner (Nurse Practitioner)    REVIEW OF SYSTEMS     Review of Systems   Improved heartburn, denies dysphagia     PHYSICAL EXAMINATION     Physical Exam  Alert with normal thought and judgment.   Normal affect and mood.       REVIEWED DATA     Labs:     Lab Results   Component Value Date     12/20/2024    K 4.2 12/20/2024    CALCIUM 9.6 12/20/2024    AST 20 12/20/2024    ALT 19 12/20/2024    BUN 17 12/20/2024    CREATININE 0.90 12/20/2024    CREATININE 0.88 09/18/2024     "CREATININE 0.94 09/13/2023     Lab Results   Component Value Date    HGBA1C 5.20 09/18/2024    HGBA1C 5.60 09/27/2022    HGBA1C 5.40 08/22/2017   No results found for: \"MALBCRERATIO\"  Lab Results   Component Value Date     (H) 09/18/2024     (H) 09/13/2023     (H) 09/27/2022    HDL 65 (H) 09/18/2024    HDL 70 (H) 09/13/2023    TRIG 91 09/18/2024    TRIG 70 09/13/2023     Lab Results   Component Value Date    TSH 1.610 09/18/2024    TSH 1.240 09/27/2022    TSH 2.050 08/22/2017    FREET4 1.24 09/18/2024    FREET4 1.17 09/27/2022    FREET4 1.19 08/22/2017     Lab Results   Component Value Date    WBC 5.32 09/18/2024    HGB 13.9 09/18/2024     09/18/2024           Imaging:     DEXA Bone Density Axial (11/25/2024 09:00)           Medical Tests:     Upper GI Endoscopy (03/17/2025 07:50)   - 2 cm hiatal hernia. - LA Grade B reflux esophagitis with no bleeding. Biopsied. - Multiple gastric polyps. Biopsied.        Component  Ref Range & Units (hover)    Case Report   Surgical Pathology Report                         Case: [id number]                                   Authorizing Provider:  Rafael Kumari MD    Collected:           03/17/2025 08:00 AM           Ordering Location:     [facility]     Received:            03/17/2025 08:42 AM                                  [facility] MAIN OR                                                     Pathologist:           Jose Gillespie MD                                                           Specimens:   1) - Small Intestine, small bowel bx; r/o Celiac                                                    2) - Gastric, Antrum, antrum gastritis                                                              3) - Gastric, Body, gastric polyps                                                                  4) - GE Junction, GE junction esophagitis                                                            5) - Large Intestine, Sigmoid " "Colon, sigmoid colon polyp                                  Final Diagnosis   1.  Small bowel, biopsy: Benign small bowel mucosa with               A. Normal intact villous surface with mild reactive epithelial changes.               B. No significant inflammation, no granulomas.               C. No viral inclusions or other organisms on routinely stained sections.     2.  Stomach, antrum, biopsy: Antral and oxyntic type gastric mucosa with               A. No significant inflammation.               B. No metaplasia, dysplasia nor malignancy.               C. No H. pylori-like organisms identified.     3.  Stomach, \"polyp\", biopsy: Oxyntic type gastric mucosa with               A. Fragment of fundic gland polyp.               B. No significant inflammation, metaplasia, dysplasia nor malignancy.               C. No H. pylori-like organisms identified.     4.  Gastroesophageal junction, biopsy: Squamoglandular mucosa with   A. Mild to moderate esophagitis with eosinophils (eosinophils measure up to approximately 4 per 40 X high-power field) with areas suggesting erosion and repair.  B. Rare cells suspicious for goblet cell metaplasia by routine and Alcian blue staining suspicious for developing Zurita's esophagus.  C. No dysplasia nor malignancy.               D. No viral inclusions nor fungal organisms identified.     5.  Colon, sigmoid, biopsy:               A.  Tubular adenoma.       Summary of old records / correspondence / consultant report:             Request outside records:       "

## 2025-03-28 NOTE — ASSESSMENT & PLAN NOTE
Recent DEXA showed osteoporosis, deferred medication.  Avoid bisphosphonate therapy. Recommended Prolia if initiating therapy.

## 2025-04-30 NOTE — PROGRESS NOTES
"Chief Complaint   Patient presents with    GI Problem         History of Present Illness  Patient is a 60-year-old female who presents today for follow-up.  She was seen in the office December 2024 with concerns about left upper quadrant pain.  EGD was performed And showed a 2 cm hiatal hernia, LA grade B esophagitis, and gastritis. Biopsies were consistent with developing Zurita's esophagus.  Colonoscopy was performed for screening purposes and showed 1 adenomatous polyp and internal hemorrhoids.    Patient presents today for follow-up.  She has been taking pantoprazole.  She has had no issues with reflux since starting this medication.  She had previously been on famotidine however she prefers to minimize medications when possible.  She has a history of osteoporosis.    She has questions about surgery to fix hiatal hernia.     Result Review :       Tissue Pathology Exam (03/17/2025 08:00)    Colonoscopy (03/17/2025 07:50)    Upper GI Endoscopy (03/17/2025 07:50)    Office Visit with Deyanira Abraham APRN (12/31/2024)    Vital Signs:   /80 (BP Location: Left arm)   Pulse 79   Temp 96.2 °F (35.7 °C)   Ht 162.6 cm (64.02\")   Wt 73.7 kg (162 lb 8 oz)   SpO2 96%   BMI 27.88 kg/m²     Body mass index is 27.88 kg/m².     Physical Exam  Vitals reviewed.   Constitutional:       General: She is not in acute distress.     Appearance: She is well-developed.   HENT:      Head: Normocephalic and atraumatic.   Pulmonary:      Effort: Pulmonary effort is normal. No respiratory distress.   Skin:     General: Skin is dry.      Coloration: Skin is not pale.   Neurological:      Mental Status: She is alert and oriented to person, place, and time.   Psychiatric:         Thought Content: Thought content normal.           Assessment and Plan    Diagnoses and all orders for this visit:    1. Gastroesophageal reflux disease with esophagitis without hemorrhage (Primary)    2. Gastritis without bleeding, unspecified " chronicity, unspecified gastritis type    3. Hiatal hernia    4. History of colon polyps    5. Zurita's esophagus without dysplasia    Other orders  -     pantoprazole (PROTONIX) 40 MG EC tablet; Take 1 tablet by mouth Daily.  Dispense: 30 tablet; Refill: 1         Discussion  Patient presents today for follow-up after EGD and colonoscopy.  Reviewed EGD findings at today's office visit with evidence of esophagitis, gastritis, hiatal hernia, and biopsies suggesting developing Zurita's esophagus.   Due to active esophagitis, recommended continuing pantoprazole for 8 to 12 weeks to allow for healing.  After that time as long as she is feeling better it would be okay to discontinue and could manage symptoms with diet or use famotidine as needed.  We reviewed dietary modifications to help with reflux at today's office visit.  We will plan on follow-up EGD in 1 year for reassessment.  As she is has minimal symptoms and hernia is small size discussed with her today I do not feel surgery needed at this time but if she would like to meet with a surgeon to discuss surgical treatment options we can arrange.  She declined at this time but will call the office in the future if she would like to have this referral and we can help facilitate.  Discussed biopsies suggesting Zurita's esophagus, and increased risk of esophageal cancer seen with this condition, and need for surveillance.  Will repeat EGD for reassessment in 1 year.  Colonoscopy with 1 adenomatous polyp for which she will need a repeat colonoscopy in 5 years.  She may follow-up with our office yearly and as needed for any new or worsening symptoms.          Follow Up   Return in about 1 year (around 5/1/2026), or if symptoms worsen or fail to improve.    Patient Instructions   For esophagitis, take pantoprazole for 8-12 weeks to help with healing. OK to stop taking after that time and can switch to famotidine if needed.    For GERD, follow antireflux precautions.   Recommend avoiding eating within 3 to 4 hours of bedtime.  Avoid foods that can trigger symptoms which may include citrus fruits, spicy foods, tomatoes and red sauces, chocolate, coffee/tea, caffeinated or carbonated beverages, alcohol.     For surveillance of Zurita's esophagus, EGD recommended at 1-year interval, due March 2026.    For surveillance of colon polyps, colonoscopy recommended at 5-year interval, due March 2030.

## 2025-05-01 ENCOUNTER — OFFICE VISIT (OUTPATIENT)
Dept: GASTROENTEROLOGY | Facility: CLINIC | Age: 60
End: 2025-05-01
Payer: COMMERCIAL

## 2025-05-01 VITALS
HEIGHT: 64 IN | DIASTOLIC BLOOD PRESSURE: 80 MMHG | SYSTOLIC BLOOD PRESSURE: 130 MMHG | OXYGEN SATURATION: 96 % | TEMPERATURE: 96.2 F | WEIGHT: 162.5 LBS | BODY MASS INDEX: 27.74 KG/M2 | HEART RATE: 79 BPM

## 2025-05-01 DIAGNOSIS — K44.9 HIATAL HERNIA: ICD-10-CM

## 2025-05-01 DIAGNOSIS — K21.00 GASTROESOPHAGEAL REFLUX DISEASE WITH ESOPHAGITIS WITHOUT HEMORRHAGE: Primary | ICD-10-CM

## 2025-05-01 DIAGNOSIS — K22.70 BARRETT'S ESOPHAGUS WITHOUT DYSPLASIA: ICD-10-CM

## 2025-05-01 DIAGNOSIS — K29.70 GASTRITIS WITHOUT BLEEDING, UNSPECIFIED CHRONICITY, UNSPECIFIED GASTRITIS TYPE: ICD-10-CM

## 2025-05-01 DIAGNOSIS — Z86.0100 HISTORY OF COLON POLYPS: ICD-10-CM

## 2025-05-01 PROCEDURE — 99214 OFFICE O/P EST MOD 30 MIN: CPT | Performed by: NURSE PRACTITIONER

## 2025-05-01 RX ORDER — PANTOPRAZOLE SODIUM 40 MG/1
40 TABLET, DELAYED RELEASE ORAL DAILY
Qty: 30 TABLET | Refills: 1 | Status: SHIPPED | OUTPATIENT
Start: 2025-05-01

## 2025-05-01 NOTE — PATIENT INSTRUCTIONS
For esophagitis, take pantoprazole for 8-12 weeks to help with healing. OK to stop taking after that time and can switch to famotidine if needed.    For GERD, follow antireflux precautions.  Recommend avoiding eating within 3 to 4 hours of bedtime.  Avoid foods that can trigger symptoms which may include citrus fruits, spicy foods, tomatoes and red sauces, chocolate, coffee/tea, caffeinated or carbonated beverages, alcohol.     For surveillance of Zurita's esophagus, EGD recommended at 1-year interval, due March 2026.    For surveillance of colon polyps, colonoscopy recommended at 5-year interval, due March 2030.

## 2025-06-09 ENCOUNTER — TELEPHONE (OUTPATIENT)
Dept: INTERNAL MEDICINE | Age: 60
End: 2025-06-09

## 2025-06-09 DIAGNOSIS — K21.00 GASTROESOPHAGEAL REFLUX DISEASE WITH ESOPHAGITIS WITHOUT HEMORRHAGE: Primary | Chronic | ICD-10-CM

## 2025-06-09 DIAGNOSIS — K21.00 GASTROESOPHAGEAL REFLUX DISEASE WITH ESOPHAGITIS WITHOUT HEMORRHAGE: Chronic | ICD-10-CM

## 2025-06-09 RX ORDER — FAMOTIDINE 20 MG/1
20 TABLET, FILM COATED ORAL 2 TIMES DAILY
Qty: 180 TABLET | Refills: 1 | Status: SHIPPED | OUTPATIENT
Start: 2025-06-09

## 2025-06-09 RX ORDER — FAMOTIDINE 20 MG/1
20 TABLET, FILM COATED ORAL 2 TIMES DAILY
Qty: 60 TABLET | Refills: 0 | Status: SHIPPED | OUTPATIENT
Start: 2025-06-09 | End: 2025-06-09

## 2025-06-09 NOTE — TELEPHONE ENCOUNTER
Caller: Autumn Geiger    Relationship: Self    Best call back number: [phone]     What medication are you requesting: FAMOTIDINE 20 MG    What are your current symptoms: ACID REFLUX    How long have you been experiencing symptoms: ONGOING    Have you had these symptoms before:    [x] Yes  [] No    Have you been treated for these symptoms before:   [x] Yes  [] No    If a prescription is needed, what is your preferred pharmacy and phone number: [id number] - [place] [id number] - [phone]  - [phone] FX     Additional notes: PATIENT IS HAVING SOME DISCOMFORT WITH HER ACID REFLUX AND IS REQUESTING A NEW PRESCRIPTION FOR THIS MEDICATION TO BE SENT TO HER PHARMACY    PLEASE CALL TO ADVISE IF SHE NEEDS TO BE SEEN IN OFFICE FIRST.

## 2025-07-07 DIAGNOSIS — K21.00 GASTROESOPHAGEAL REFLUX DISEASE WITH ESOPHAGITIS WITHOUT HEMORRHAGE: Chronic | ICD-10-CM

## 2025-07-08 RX ORDER — FAMOTIDINE 20 MG/1
20 TABLET, FILM COATED ORAL 2 TIMES DAILY
Qty: 180 TABLET | Refills: 1 | Status: SHIPPED | OUTPATIENT
Start: 2025-07-08 | End: 2025-07-14 | Stop reason: SDUPTHER

## 2025-07-14 ENCOUNTER — TELEPHONE (OUTPATIENT)
Dept: INTERNAL MEDICINE | Age: 60
End: 2025-07-14
Payer: COMMERCIAL

## 2025-07-14 DIAGNOSIS — K21.00 GASTROESOPHAGEAL REFLUX DISEASE WITH ESOPHAGITIS WITHOUT HEMORRHAGE: Chronic | ICD-10-CM

## 2025-07-14 RX ORDER — FAMOTIDINE 20 MG/1
20 TABLET, FILM COATED ORAL 2 TIMES DAILY
Qty: 180 TABLET | Refills: 1 | Status: SHIPPED | OUTPATIENT
Start: 2025-07-14

## 2025-07-14 NOTE — TELEPHONE ENCOUNTER
Caller: Autumn Geiger    Relationship: Self    Best call back number: [phone]    Which medication are you concerned about: FAMOTIDINE    Who prescribed you this medication: Sanchez Perez MD      When did you start taking this medication:     What are your concerns: PHARMACY HAS NOT RECEIVED THE PRESCRIPTION    How long have you had these concerns:

## (undated) DEVICE — KT ORCA ORCAPOD DISP STRL

## (undated) DEVICE — VIAL FORMLN CAP 10PCT 20ML

## (undated) DEVICE — ADAPT CLN SCPE ENDO PORPOISE BX/50 DISP

## (undated) DEVICE — SINGLE-USE BIOPSY FORCEPS: Brand: RADIAL JAW 4

## (undated) DEVICE — GOWN,SIRUS,NONRNF,3XL,18/CS: Brand: MEDLINE

## (undated) DEVICE — SNAR POLYP CAPTIVATOR/COLD STFF RND 10MM 240CM

## (undated) DEVICE — THE SINGLE USE ETRAP – POLYP TRAP IS USED FOR SUCTION RETRIEVAL OF ENDOSCOPICALLY REMOVED POLYPS.: Brand: ETRAP

## (undated) DEVICE — GOWN ISOL W/THUMB UNIV BLU BX/15

## (undated) DEVICE — MSK ENDO PORT O2 POM ELITE CURAPLEX A/

## (undated) DEVICE — BLCK/BITE BLOX W/DENTL/RIM W/STRAP 54F

## (undated) DEVICE — FLEX ADVANTAGE 1500CC: Brand: FLEX ADVANTAGE

## (undated) DEVICE — SYRINGE, LUER SLIP, STERILE, 60ML: Brand: MEDLINE

## (undated) DEVICE — Device